# Patient Record
Sex: MALE | Race: WHITE | NOT HISPANIC OR LATINO | Employment: FULL TIME | RURAL
[De-identification: names, ages, dates, MRNs, and addresses within clinical notes are randomized per-mention and may not be internally consistent; named-entity substitution may affect disease eponyms.]

---

## 2019-04-12 ENCOUNTER — OFFICE VISIT (OUTPATIENT)
Dept: OTOLARYNGOLOGY | Facility: CLINIC | Age: 39
End: 2019-04-12

## 2019-04-12 VITALS — WEIGHT: 274 LBS | HEIGHT: 70 IN | BODY MASS INDEX: 39.22 KG/M2 | OXYGEN SATURATION: 98 %

## 2019-04-12 DIAGNOSIS — J32.8 OTHER CHRONIC SINUSITIS: Primary | ICD-10-CM

## 2019-04-12 DIAGNOSIS — J32.9 CHRONIC SINUSITIS, UNSPECIFIED LOCATION: Primary | ICD-10-CM

## 2019-04-12 DIAGNOSIS — J34.89 NASAL VALVE COLLAPSE: ICD-10-CM

## 2019-04-12 PROCEDURE — 31231 NASAL ENDOSCOPY DX: CPT | Performed by: OTOLARYNGOLOGY

## 2019-04-12 PROCEDURE — 99203 OFFICE O/P NEW LOW 30 MIN: CPT | Performed by: OTOLARYNGOLOGY

## 2019-04-12 RX ORDER — LISINOPRIL 10 MG/1
10 TABLET ORAL DAILY
COMMUNITY

## 2019-04-12 RX ORDER — DEXTROAMPHETAMINE SACCHARATE, AMPHETAMINE ASPARTATE, DEXTROAMPHETAMINE SULFATE AND AMPHETAMINE SULFATE 7.5; 7.5; 7.5; 7.5 MG/1; MG/1; MG/1; MG/1
30 TABLET ORAL DAILY
COMMUNITY

## 2019-04-12 RX ORDER — SERTRALINE HYDROCHLORIDE 100 MG/1
100 TABLET, FILM COATED ORAL NIGHTLY
COMMUNITY

## 2019-04-12 RX ORDER — ATENOLOL 25 MG/1
25 TABLET ORAL DAILY
COMMUNITY

## 2019-04-12 RX ORDER — OMEPRAZOLE 40 MG/1
40 CAPSULE, DELAYED RELEASE ORAL DAILY
COMMUNITY

## 2019-04-12 RX ORDER — PREGABALIN 200 MG/1
200 CAPSULE ORAL 3 TIMES DAILY
COMMUNITY

## 2019-04-12 RX ORDER — LOPERAMIDE HYDROCHLORIDE 2 MG/1
2 CAPSULE ORAL 4 TIMES DAILY PRN
COMMUNITY

## 2019-04-12 RX ORDER — TRAZODONE HYDROCHLORIDE 50 MG/1
25 TABLET ORAL NIGHTLY
COMMUNITY

## 2019-04-12 RX ORDER — SUMATRIPTAN 100 MG/1
100 TABLET, FILM COATED ORAL
COMMUNITY

## 2019-04-12 NOTE — PROGRESS NOTES
Subjective   Esequiel Figueroa is a 39 y.o. male.     History of Present Illness   Patient is seen at the request of the [facility].  Has a history of previous sinus surgery performed in 2009 while in the [employer].  Reports he has had recurring difficulty with episodes of purulent rhinorrhea, postnasal drainage, facial pressure, and sinus headache.  Has had multiple rounds of antibiotics through the winter.  Additionally is on a full facemask CPAP, and has difficulty utilizing this due to collapse of his external nasal airway on the left.  Patient states this is been present for years.  Awakens every morning with crusting and congestion in his nares and states his CPAP is ineffective and he still feels tired during the day and he perceives this to be the cause.      The following portions of the patient's history were reviewed and updated as appropriate: allergies, current medications, past family history, past medical history, past social history, past surgical history and problem list.      Esequiel Figueroa reports that he has quit smoking. He has never used smokeless tobacco.  Patient is not a tobacco user and has not been counseled for use of tobacco products    Family History   Problem Relation Age of Onset   • Thyroid disease Mother        Allergies   Allergen Reactions   • Septra [Sulfamethoxazole-Trimethoprim] Anaphylaxis   • Penicillins Hives   • Vicodin [Hydrocodone-Acetaminophen] Hives         Current Outpatient Medications:   •  amphetamine-dextroamphetamine (ADDERALL) 30 MG tablet, Take 30 mg by mouth Daily., Disp: , Rfl:   •  atenolol (TENORMIN) 25 MG tablet, Take 25 mg by mouth Daily., Disp: , Rfl:   •  DULOXETINE HCL PO, Take 90 mg by mouth., Disp: , Rfl:   •  lisinopril (PRINIVIL,ZESTRIL) 10 MG tablet, Take 10 mg by mouth Daily., Disp: , Rfl:   •  loperamide (IMODIUM) 2 MG capsule, Take 2 mg by mouth 4 (Four) Times a Day As Needed for Diarrhea., Disp: , Rfl:   •  omeprazole (priLOSEC) 40 MG  capsule, Take 40 mg by mouth Daily., Disp: , Rfl:   •  pregabalin (LYRICA) 200 MG capsule, Take 200 mg by mouth 2 (Two) Times a Day., Disp: , Rfl:   •  sertraline (ZOLOFT) 100 MG tablet, Take 100 mg by mouth Daily., Disp: , Rfl:   •  SUMAtriptan (IMITREX) 100 MG tablet, Take 100 mg by mouth Every 2 (Two) Hours As Needed for Migraine. Take one tablet at onset of headache. May repeat dose one time in 2 hours if headache not relieved., Disp: , Rfl:   •  tapentadol (NUCYNTA) 50 MG tablet, Take 100 mg by mouth Every 6 (Six) Hours As Needed., Disp: , Rfl:   •  traZODone (DESYREL) 25 MG half tablet, Take 25 mg by mouth Every Night., Disp: , Rfl:     Past Medical History:   Diagnosis Date   • IBS (irritable bowel syndrome)    • Neuropathy    • PTSD (post-traumatic stress disorder)    • Sleep apnea          Review of Systems   Gastrointestinal: Positive for abdominal pain and diarrhea.        Heartburn   Musculoskeletal: Positive for arthralgias.        Leg pain   Psychiatric/Behavioral:        Depression   All other systems reviewed and are negative.          Objective   Physical Exam  General: Well-developed well-nourished male in no acute distress.  Alert and oriented x3. Head: Normocephalic. Face: Symmetrical strength and appearance. PERRL. EOMI. Voice:Strong. Speech:Fluent  Ears: External ears no deformity, canals show some nonobstructing wax., tympanic membranes appear to be without infection or effusion  Nose: Nares show no discharge mass polyp or purulence.  Boggy mucosa is present.  No gross external deformity.  Septum: Midline.  Substantial nasal valve collapse on the left with inspiration  Oral cavity: Lips and gums without lesions.  Tongue and floor of mouth without lesions.  Parotid and submandibular ducts unobstructed.  No mucosal lesions on the buccal mucosa or vestibule of the mouth.  Pharynx: No erythema exudate mass or ulcer  Neck: No lymphadenopathy.  No thyromegaly.  Trachea and larynx midline.  No masses  in the parotid or submandibular glands.  Nasal endoscopy is performed: Willian-Synephrine and Xylocaine are instilled the nares bilaterally.  0° scope is passed into each nostril.  The inferior, middle, and superior turbinates as well as nasal septum and nasopharynx are examined.  Pertinent findings include: Postsurgical changes in the middle meatal clefts bilaterally.  Synechia between the medial aspect of the middle turbinate and the nasal septum bilaterally.  Small maxillary antrostomy is present on the left.  Larger antrostomy is present on the right.  No evidence of polyps or purulence seen today.      Assessment/Plan   Esequiel was seen today for sinus problem.    Diagnoses and all orders for this visit:    Chronic sinusitis, unspecified location    Nasal valve collapse        Plan: Explained to the patient that I do not perform repair of nasal valve collapse in my practice but my partner Dr. Chavez does.  I will obtain a CT scan of the sinuses to evaluate for the presence of chronic sinus disease/retained infection and then have the patient return for an evaluation by Dr. Chavez.

## 2019-05-16 ENCOUNTER — OFFICE VISIT (OUTPATIENT)
Dept: OTOLARYNGOLOGY | Facility: CLINIC | Age: 39
End: 2019-05-16

## 2019-05-16 ENCOUNTER — PREP FOR SURGERY (OUTPATIENT)
Dept: OTHER | Facility: HOSPITAL | Age: 39
End: 2019-05-16

## 2019-05-16 ENCOUNTER — APPOINTMENT (OUTPATIENT)
Dept: PREADMISSION TESTING | Facility: HOSPITAL | Age: 39
End: 2019-05-16

## 2019-05-16 VITALS
HEART RATE: 91 BPM | DIASTOLIC BLOOD PRESSURE: 86 MMHG | OXYGEN SATURATION: 96 % | HEIGHT: 70 IN | SYSTOLIC BLOOD PRESSURE: 144 MMHG | BODY MASS INDEX: 39.51 KG/M2 | RESPIRATION RATE: 14 BRPM | WEIGHT: 276 LBS

## 2019-05-16 VITALS
WEIGHT: 276.4 LBS | HEART RATE: 120 BPM | BODY MASS INDEX: 39.57 KG/M2 | TEMPERATURE: 98.5 F | OXYGEN SATURATION: 98 % | HEIGHT: 70 IN

## 2019-05-16 DIAGNOSIS — J34.3 HYPERTROPHY, NASAL, TURBINATE: ICD-10-CM

## 2019-05-16 DIAGNOSIS — J34.2 NASAL SEPTAL DEVIATION: ICD-10-CM

## 2019-05-16 DIAGNOSIS — J34.89 NASAL VALVE COLLAPSE: Primary | ICD-10-CM

## 2019-05-16 DIAGNOSIS — J34.3 NASAL TURBINATE HYPERTROPHY: ICD-10-CM

## 2019-05-16 DIAGNOSIS — J34.89 NASAL VALVE BLOCKAGE: Primary | ICD-10-CM

## 2019-05-16 PROCEDURE — 99214 OFFICE O/P EST MOD 30 MIN: CPT | Performed by: OTOLARYNGOLOGY

## 2019-05-16 PROCEDURE — 93010 ELECTROCARDIOGRAM REPORT: CPT | Performed by: INTERNAL MEDICINE

## 2019-05-16 PROCEDURE — 93005 ELECTROCARDIOGRAM TRACING: CPT

## 2019-05-16 RX ORDER — FLUTICASONE PROPIONATE 50 MCG
2 SPRAY, SUSPENSION (ML) NASAL DAILY
COMMUNITY
End: 2019-05-24 | Stop reason: HOSPADM

## 2019-05-16 RX ORDER — CETIRIZINE HYDROCHLORIDE 10 MG/1
10 TABLET ORAL NIGHTLY
COMMUNITY

## 2019-05-16 RX ORDER — MONTELUKAST SODIUM 10 MG/1
10 TABLET ORAL DAILY
COMMUNITY

## 2019-05-16 RX ORDER — SODIUM CHLORIDE, SODIUM GLUCONATE, SODIUM ACETATE, POTASSIUM CHLORIDE, AND MAGNESIUM CHLORIDE 526; 502; 368; 37; 30 MG/100ML; MG/100ML; MG/100ML; MG/100ML; MG/100ML
1000 INJECTION, SOLUTION INTRAVENOUS CONTINUOUS
Status: CANCELLED | OUTPATIENT
Start: 2019-05-24

## 2019-05-16 NOTE — PROGRESS NOTES
Subjective   Esequiel Figueroa is a 39 y.o. male.   Follow-up nasal problems  History of Present Illness   She comes referred for opinion regarding nasal valve surgery he has a CT which she brings with him we do not have the actual report he has trouble breathing through his nose his nose collapses more on the left than the right he has had sinus surgery in the past is not having a lot of sinus infections biggest problems breathing through his nose and using his CPAP he uses a full mask    States doing well with multiple other surgeries including sinus surgery is not any anesthesia or bleeding problems and understands her risk of the surgery he works a more sedentary job  The following portions of the patient's history were reviewed and updated as appropriate: allergies, current medications, past family history, past medical history, past social history, past surgical history and problem list.      Esequiel Figueroa reports that he has quit smoking. He has never used smokeless tobacco. He reports that he does not drink alcohol or use drugs.  Patient is not a tobacco user and has been counseled for use of tobacco products    Family History   Problem Relation Age of Onset   • Thyroid disease Mother          Current Outpatient Medications:   •  amphetamine-dextroamphetamine (ADDERALL) 30 MG tablet, Take 30 mg by mouth Daily., Disp: , Rfl:   •  atenolol (TENORMIN) 25 MG tablet, Take 25 mg by mouth Daily., Disp: , Rfl:   •  DULOXETINE HCL PO, Take 90 mg by mouth., Disp: , Rfl:   •  lisinopril (PRINIVIL,ZESTRIL) 10 MG tablet, Take 10 mg by mouth Daily., Disp: , Rfl:   •  loperamide (IMODIUM) 2 MG capsule, Take 2 mg by mouth 4 (Four) Times a Day As Needed for Diarrhea., Disp: , Rfl:   •  omeprazole (priLOSEC) 40 MG capsule, Take 40 mg by mouth Daily., Disp: , Rfl:   •  pregabalin (LYRICA) 200 MG capsule, Take 200 mg by mouth 2 (Two) Times a Day., Disp: , Rfl:   •  sertraline (ZOLOFT) 100 MG tablet, Take 100 mg by mouth Daily.,  Disp: , Rfl:   •  SUMAtriptan (IMITREX) 100 MG tablet, Take 100 mg by mouth Every 2 (Two) Hours As Needed for Migraine. Take one tablet at onset of headache. May repeat dose one time in 2 hours if headache not relieved., Disp: , Rfl:   •  tapentadol (NUCYNTA) 50 MG tablet, Take 100 mg by mouth Every 6 (Six) Hours As Needed., Disp: , Rfl:   •  traZODone (DESYREL) 25 MG half tablet, Take 25 mg by mouth Every Night., Disp: , Rfl:     Allergies   Allergen Reactions   • Septra [Sulfamethoxazole-Trimethoprim] Anaphylaxis   • Penicillins Hives   • Vicodin [Hydrocodone-Acetaminophen] Hives       Past Medical History:   Diagnosis Date   • IBS (irritable bowel syndrome)    • Neuropathy    • PTSD (post-traumatic stress disorder)    • Sleep apnea        Past Surgical History:   Procedure Laterality Date   • APPENDECTOMY     • CARPAL TUNNEL RELEASE Bilateral    • KNEE ARTHROSCOPY     • ROTATOR CUFF REPAIR Right    • TRIGGER FINGER RELEASE     • ULNAR NERVE REPAIR Bilateral    • WRIST SURGERY         Review of Systems   HENT: Positive for congestion.    Gastrointestinal: Positive for diarrhea.   Musculoskeletal: Positive for arthralgias, back pain, neck pain and neck stiffness.   Neurological: Positive for numbness and headaches.   All other systems reviewed and are negative.          Objective   Physical Exam   Constitutional: He is oriented to person, place, and time. He appears well-developed and well-nourished.   HENT:   Head: Normocephalic and atraumatic.   Right Ear: Hearing, tympanic membrane, external ear and ear canal normal.   Left Ear: Hearing, tympanic membrane, external ear and ear canal normal.   Nose: Mucosal edema, nasal deformity and septal deviation present. No rhinorrhea. No epistaxis. Right sinus exhibits no maxillary sinus tenderness and no frontal sinus tenderness. Left sinus exhibits no maxillary sinus tenderness and no frontal sinus tenderness.       Mouth/Throat: Uvula is midline and oropharynx is clear  and moist. No trismus in the jaw. Normal dentition. No oropharyngeal exudate or posterior oropharyngeal edema.   Eyes: Conjunctivae are normal.   Neck: Neck supple. No JVD present. No tracheal deviation present. No thyromegaly present.   Cardiovascular: Normal rate, regular rhythm and normal heart sounds.   Pulmonary/Chest: Effort normal and breath sounds normal.   Musculoskeletal: Normal range of motion.   Lymphadenopathy:        Head (right side): No submental, no submandibular, no tonsillar, no preauricular, no posterior auricular and no occipital adenopathy present.        Head (left side): No submental, no submandibular, no tonsillar, no preauricular, no posterior auricular and no occipital adenopathy present.     He has no cervical adenopathy.        Right cervical: No superficial cervical, no deep cervical and no posterior cervical adenopathy present.       Left cervical: No superficial cervical, no deep cervical and no posterior cervical adenopathy present.   Neurological: He is alert and oriented to person, place, and time. No cranial nerve deficit.   Skin: Skin is warm.   Psychiatric: He has a normal mood and affect. His speech is normal and behavior is normal. Thought content normal.   Nursing note and vitals reviewed.      CT was reviewed by me showing deviated septum no active sinus disease and turbinate hypertrophy      Assessment/Plan   Esequiel was seen today for results.    Diagnoses and all orders for this visit:    Nasal valve collapse    Nasal septal deviation    Nasal turbinate hypertrophy    Discussed surgery versus medical options the patient prefers surgical approach she is interested nasal septal turbinate surgery and valve surgery particularly that issue seems to be the major issue is breathing discussed surgical risks of chronic crusting  , congestion  , decreased sense of smell, delayed bleeding, infection and difficulty using CPAP  I discussed the risk of surgery which include failure to  help, chronic crusting chronic breathing problems need for other surgery change in appearance intolerance almost certainly likely changes parents discussed the postop care and decreased sense of smell decreased activity no swelling need for pain medicine afterwards we discussed that this does not always work notes likely be helpful he wants to go with surgical approach and all questions were answered

## 2019-05-16 NOTE — PAT
Called Dr. Patterson read EKG over the phone, patient relates only heart history hypertension, no hx of MI, can climb 2 flights of stairs without getting short of breath, has no current or hx of chest pain, may proceed with surgery no further orders or instructions.  Opioid pain medication pamphlet given.

## 2019-05-16 NOTE — PATIENT INSTRUCTIONS

## 2019-05-16 NOTE — DISCHARGE INSTRUCTIONS
[facility]  Pre-op Information and Guidelines    You will be called after 2 p.m. the day before your surgery (Friday for Monday surgery) and notified of your time for arrival and approximate surgery time.  If you have not received a call by 4P.M., please contact Same Day Surgery at [phone] of if outside [facility] call [phone].    Please Follow these Important Safety Guidelines:    • The morning of your procedure, take only the medications listed below with   A sip of water:_____________________________________________       ______________________________________________    • DO NOT eat or drink anything after 12:00 midnight the night before surgery  Specific instructions concerning drinking clear liquids will be discussed during  the pre-surgery instruction call the day before your surgery.    • If you take a blood thinner (ex. Plavix, Coumadin, aspirin), ask your doctor when to stop it before surgery  STOP DATE: _________________    • Only 2 visitors are allowed in patient rooms at a time  Your visitors will be asked to wait in the lobby until the admission process is complete with the exception of a parent with a child and patients in need of special assistance.    • YOU CANNOT DRIVE YOURSELF HOME  You must be accompanied by someone who will be responsible for driving you home after surgery and for your care at home.    • DO NOT chew gum, use breath mints, hard candy, or smoke the day of surgery  • DO NOT drink alcohol for at least 24 hours before your surgery  • DO NOT wear any jewelry and remove all body piercing before coming to the hospital  • DO NOT wear make-up to the hospital  • If you are having surgery on an extremity (arm/leg/foot) remove nail polish/artificial nails on the surgical side  • Clothing, glasses, contacts, dentures, and hairpieces must be removed before surgery  • Bathe the night before or the morning of your surgery and do not use powders/lotions on  skin.

## 2019-05-23 ENCOUNTER — ANESTHESIA EVENT (OUTPATIENT)
Dept: PERIOP | Facility: HOSPITAL | Age: 39
End: 2019-05-23

## 2019-05-24 ENCOUNTER — HOSPITAL ENCOUNTER (OUTPATIENT)
Facility: HOSPITAL | Age: 39
Setting detail: HOSPITAL OUTPATIENT SURGERY
Discharge: HOME | End: 2019-05-24
Attending: OTOLARYNGOLOGY | Admitting: OTOLARYNGOLOGY

## 2019-05-24 ENCOUNTER — ANESTHESIA (OUTPATIENT)
Dept: PERIOP | Facility: HOSPITAL | Age: 39
End: 2019-05-24

## 2019-05-24 VITALS
DIASTOLIC BLOOD PRESSURE: 70 MMHG | HEART RATE: 77 BPM | WEIGHT: 272.49 LBS | HEIGHT: 70 IN | SYSTOLIC BLOOD PRESSURE: 127 MMHG | OXYGEN SATURATION: 99 % | TEMPERATURE: 97 F | BODY MASS INDEX: 39.01 KG/M2 | RESPIRATION RATE: 18 BRPM

## 2019-05-24 PROCEDURE — 30465 REPAIR NASAL STENOSIS: CPT | Performed by: OTOLARYNGOLOGY

## 2019-05-24 PROCEDURE — 25010000002 ONDANSETRON PER 1 MG: Performed by: NURSE ANESTHETIST, CERTIFIED REGISTERED

## 2019-05-24 PROCEDURE — 25010000002 SUCCINYLCHOLINE PER 20 MG: Performed by: NURSE ANESTHETIST, CERTIFIED REGISTERED

## 2019-05-24 PROCEDURE — 25010000002 PHENYLEPHRINE PER 1 ML: Performed by: NURSE ANESTHETIST, CERTIFIED REGISTERED

## 2019-05-24 PROCEDURE — 25010000002 DEXAMETHASONE PER 1 MG: Performed by: NURSE ANESTHETIST, CERTIFIED REGISTERED

## 2019-05-24 PROCEDURE — 25010000002 FENTANYL CITRATE (PF) 100 MCG/2ML SOLUTION: Performed by: NURSE ANESTHETIST, CERTIFIED REGISTERED

## 2019-05-24 PROCEDURE — 30130 EXCISE INFERIOR TURBINATE: CPT | Performed by: OTOLARYNGOLOGY

## 2019-05-24 PROCEDURE — 25010000002 PROPOFOL 10 MG/ML EMULSION: Performed by: NURSE ANESTHETIST, CERTIFIED REGISTERED

## 2019-05-24 RX ORDER — ROCURONIUM BROMIDE 10 MG/ML
INJECTION, SOLUTION INTRAVENOUS AS NEEDED
Status: DISCONTINUED | OUTPATIENT
Start: 2019-05-24 | End: 2019-05-24 | Stop reason: SURG

## 2019-05-24 RX ORDER — HYDROMORPHONE HYDROCHLORIDE 2 MG/1
2 TABLET ORAL EVERY 4 HOURS PRN
Qty: 18 TABLET | Refills: 0 | Status: SHIPPED | OUTPATIENT
Start: 2019-05-24 | End: 2019-07-02 | Stop reason: HOSPADM

## 2019-05-24 RX ORDER — DEXAMETHASONE SODIUM PHOSPHATE 4 MG/ML
INJECTION, SOLUTION INTRA-ARTICULAR; INTRALESIONAL; INTRAMUSCULAR; INTRAVENOUS; SOFT TISSUE AS NEEDED
Status: DISCONTINUED | OUTPATIENT
Start: 2019-05-24 | End: 2019-05-24 | Stop reason: SURG

## 2019-05-24 RX ORDER — EPHEDRINE SULFATE 50 MG/ML
INJECTION, SOLUTION INTRAVENOUS AS NEEDED
Status: DISCONTINUED | OUTPATIENT
Start: 2019-05-24 | End: 2019-05-24 | Stop reason: SURG

## 2019-05-24 RX ORDER — SODIUM CHLORIDE, SODIUM GLUCONATE, SODIUM ACETATE, POTASSIUM CHLORIDE, AND MAGNESIUM CHLORIDE 526; 502; 368; 37; 30 MG/100ML; MG/100ML; MG/100ML; MG/100ML; MG/100ML
1000 INJECTION, SOLUTION INTRAVENOUS CONTINUOUS
Status: DISCONTINUED | OUTPATIENT
Start: 2019-05-24 | End: 2019-05-24 | Stop reason: HOSPADM

## 2019-05-24 RX ORDER — ONDANSETRON 2 MG/ML
4 INJECTION INTRAMUSCULAR; INTRAVENOUS ONCE AS NEEDED
Status: DISCONTINUED | OUTPATIENT
Start: 2019-05-24 | End: 2019-05-24 | Stop reason: HOSPADM

## 2019-05-24 RX ORDER — SUCCINYLCHOLINE CHLORIDE 20 MG/ML
INJECTION INTRAMUSCULAR; INTRAVENOUS AS NEEDED
Status: DISCONTINUED | OUTPATIENT
Start: 2019-05-24 | End: 2019-05-24 | Stop reason: SURG

## 2019-05-24 RX ORDER — OXYMETAZOLINE HYDROCHLORIDE 0.05 G/100ML
SPRAY NASAL AS NEEDED
Status: DISCONTINUED | OUTPATIENT
Start: 2019-05-24 | End: 2019-05-24 | Stop reason: HOSPADM

## 2019-05-24 RX ORDER — PROPOFOL 10 MG/ML
VIAL (ML) INTRAVENOUS AS NEEDED
Status: DISCONTINUED | OUTPATIENT
Start: 2019-05-24 | End: 2019-05-24 | Stop reason: SURG

## 2019-05-24 RX ORDER — LIDOCAINE HYDROCHLORIDE AND EPINEPHRINE 10; 10 MG/ML; UG/ML
INJECTION, SOLUTION INFILTRATION; PERINEURAL AS NEEDED
Status: DISCONTINUED | OUTPATIENT
Start: 2019-05-24 | End: 2019-05-24 | Stop reason: HOSPADM

## 2019-05-24 RX ORDER — CLINDAMYCIN HYDROCHLORIDE 150 MG/1
300 CAPSULE ORAL EVERY 8 HOURS
Qty: 36 CAPSULE | Refills: 0 | Status: SHIPPED | OUTPATIENT
Start: 2019-05-24 | End: 2019-05-31

## 2019-05-24 RX ORDER — LIDOCAINE HYDROCHLORIDE 20 MG/ML
INJECTION, SOLUTION INFILTRATION; PERINEURAL AS NEEDED
Status: DISCONTINUED | OUTPATIENT
Start: 2019-05-24 | End: 2019-05-24 | Stop reason: SURG

## 2019-05-24 RX ORDER — HYDROMORPHONE HYDROCHLORIDE 2 MG/1
2 TABLET ORAL
Status: DISCONTINUED | OUTPATIENT
Start: 2019-05-24 | End: 2019-05-24 | Stop reason: HOSPADM

## 2019-05-24 RX ORDER — ONDANSETRON 2 MG/ML
INJECTION INTRAMUSCULAR; INTRAVENOUS AS NEEDED
Status: DISCONTINUED | OUTPATIENT
Start: 2019-05-24 | End: 2019-05-24 | Stop reason: SURG

## 2019-05-24 RX ORDER — FENTANYL CITRATE 50 UG/ML
INJECTION, SOLUTION INTRAMUSCULAR; INTRAVENOUS AS NEEDED
Status: DISCONTINUED | OUTPATIENT
Start: 2019-05-24 | End: 2019-05-24 | Stop reason: SURG

## 2019-05-24 RX ADMIN — PHENYLEPHRINE HYDROCHLORIDE 200 MCG: 10 INJECTION INTRAVENOUS at 08:56

## 2019-05-24 RX ADMIN — PROPOFOL 200 MG: 10 INJECTION, EMULSION INTRAVENOUS at 08:26

## 2019-05-24 RX ADMIN — FENTANYL CITRATE 50 MCG: 50 INJECTION, SOLUTION INTRAMUSCULAR; INTRAVENOUS at 08:35

## 2019-05-24 RX ADMIN — EPHEDRINE SULFATE 10 MG: 50 INJECTION INTRAVENOUS at 08:53

## 2019-05-24 RX ADMIN — SODIUM CHLORIDE, SODIUM GLUCONATE, SODIUM ACETATE, POTASSIUM CHLORIDE, AND MAGNESIUM CHLORIDE: 526; 502; 368; 37; 30 INJECTION, SOLUTION INTRAVENOUS at 09:39

## 2019-05-24 RX ADMIN — EPHEDRINE SULFATE 10 MG: 50 INJECTION INTRAVENOUS at 09:01

## 2019-05-24 RX ADMIN — ONDANSETRON 4 MG: 2 INJECTION INTRAMUSCULAR; INTRAVENOUS at 08:41

## 2019-05-24 RX ADMIN — EPHEDRINE SULFATE 10 MG: 50 INJECTION INTRAVENOUS at 08:56

## 2019-05-24 RX ADMIN — PHENYLEPHRINE HYDROCHLORIDE 100 MCG: 10 INJECTION INTRAVENOUS at 09:27

## 2019-05-24 RX ADMIN — SODIUM CHLORIDE, SODIUM GLUCONATE, SODIUM ACETATE, POTASSIUM CHLORIDE, AND MAGNESIUM CHLORIDE 1000 ML: 526; 502; 368; 37; 30 INJECTION, SOLUTION INTRAVENOUS at 07:05

## 2019-05-24 RX ADMIN — SUCCINYLCHOLINE CHLORIDE 180 MG: 20 INJECTION, SOLUTION INTRAMUSCULAR; INTRAVENOUS at 08:29

## 2019-05-24 RX ADMIN — PHENYLEPHRINE HYDROCHLORIDE 100 MCG: 10 INJECTION INTRAVENOUS at 09:14

## 2019-05-24 RX ADMIN — PHENYLEPHRINE HYDROCHLORIDE 200 MCG: 10 INJECTION INTRAVENOUS at 09:02

## 2019-05-24 RX ADMIN — PHENYLEPHRINE HYDROCHLORIDE 100 MCG: 10 INJECTION INTRAVENOUS at 09:25

## 2019-05-24 RX ADMIN — DEXAMETHASONE SODIUM PHOSPHATE 4 MG: 4 INJECTION, SOLUTION INTRAMUSCULAR; INTRAVENOUS at 08:35

## 2019-05-24 RX ADMIN — LIDOCAINE HYDROCHLORIDE 100 MG: 20 INJECTION, SOLUTION INFILTRATION; PERINEURAL at 08:26

## 2019-05-24 RX ADMIN — PROPOFOL 200 MG: 10 INJECTION, EMULSION INTRAVENOUS at 08:30

## 2019-05-24 RX ADMIN — PHENYLEPHRINE HYDROCHLORIDE 200 MCG: 10 INJECTION INTRAVENOUS at 09:00

## 2019-05-24 RX ADMIN — EPHEDRINE SULFATE 10 MG: 50 INJECTION INTRAVENOUS at 09:12

## 2019-05-24 RX ADMIN — PHENYLEPHRINE HYDROCHLORIDE 100 MCG: 10 INJECTION INTRAVENOUS at 08:51

## 2019-05-24 RX ADMIN — FENTANYL CITRATE 50 MCG: 50 INJECTION, SOLUTION INTRAMUSCULAR; INTRAVENOUS at 08:23

## 2019-05-24 RX ADMIN — ROCURONIUM BROMIDE 10 MG: 10 INJECTION INTRAVENOUS at 08:43

## 2019-05-24 RX ADMIN — PHENYLEPHRINE HYDROCHLORIDE 100 MCG: 10 INJECTION INTRAVENOUS at 09:17

## 2019-05-24 RX ADMIN — ROCURONIUM BROMIDE 5 MG: 10 INJECTION INTRAVENOUS at 08:23

## 2019-05-24 RX ADMIN — SODIUM CHLORIDE, SODIUM GLUCONATE, SODIUM ACETATE, POTASSIUM CHLORIDE, AND MAGNESIUM CHLORIDE: 526; 502; 368; 37; 30 INJECTION, SOLUTION INTRAVENOUS at 09:01

## 2019-05-24 RX ADMIN — EPHEDRINE SULFATE 10 MG: 50 INJECTION INTRAVENOUS at 09:00

## 2019-05-24 NOTE — ANESTHESIA PROCEDURE NOTES
Airway  Urgency: elective    Date/Time: 5/24/2019 8:30 AM  End Time:5/24/2019 8:31 AM  Airway not difficult    General Information and Staff    Patient location during procedure: OR  CRNA: Mumtaz Gan CRNA    Indications and Patient Condition  Indications for airway management: airway protection    Preoxygenated: yes  MILS not maintained throughout  Mask difficulty assessment: 1 - vent by mask    Final Airway Details  Final airway type: endotracheal airway      Successful airway: ETT  Cuffed: yes   Successful intubation technique: direct laryngoscopy  Facilitating devices/methods: intubating stylet  Endotracheal tube insertion site: oral  Blade: Ria  Blade size: 3  ETT size (mm): 7.5  Cormack-Lehane Classification: grade IIb - view of arytenoids or posterior of glottis only  Placement verified by: chest auscultation and capnometry   Measured from: lips  ETT to lips (cm): 24  Number of attempts at approach: 2    Additional Comments  SRNA attempted intubation with oral LIAN

## 2019-05-24 NOTE — ANESTHESIA PREPROCEDURE EVALUATION
Anesthesia Evaluation     Patient summary reviewed   NPO Solid Status: > 8 hours  NPO Liquid Status: > 8 hours           Airway   Mallampati: II  TM distance: >3 FB  Neck ROM: full  Possible difficult intubation and Large neck circumference  Dental    (+) poor dentition    Pulmonary - normal exam   (+) asthma, sleep apnea,   Cardiovascular - normal exam  Exercise tolerance: good (4-7 METS)    NYHA Classification: II  Patient on routine beta blocker    (+) hypertension, hyperlipidemia,       Neuro/Psych  (+) headaches, psychiatric history Anxiety,     GI/Hepatic/Renal/Endo    (+)  GERD,      Musculoskeletal     Abdominal    Substance History      OB/GYN          Other   (+) arthritis                     Anesthesia Plan    ASA 3     general     intravenous induction   Anesthetic plan, all risks, benefits, and alternatives have been provided, discussed and informed consent has been obtained with: patient.

## 2019-05-24 NOTE — ANESTHESIA POSTPROCEDURE EVALUATION
Patient: Esequiel Figueroa    Procedure Summary     Date:  05/24/19 Room / Location:  [facility] OR  / [facility] OR    Anesthesia Start:  0819 Anesthesia Stop:  0952    Procedure:  NASAL VALVE REPAIR , NASAL SEPTAL SURGERY, AND BILATERAL TURBINATE SURGERY (N/A ) Diagnosis:       Nasal valve blockage      Nasal septal deviation      Hypertrophy, nasal, turbinate      (Nasal valve blockage [J34.89])      (Nasal septal deviation [J34.2])      (Hypertrophy, nasal, turbinate [J34.3])    Surgeon:  Jt Chavez MD Provider:  Albaro Patterson MD    Anesthesia Type:  general ASA Status:  3          Anesthesia Type: general  Last vitals  BP   106/68 (05/24/19 0657)   Temp   97.2 °F (36.2 °C) (05/24/19 0657)   Pulse   64 (05/24/19 0657)   Resp   18 (05/24/19 0657)     SpO2   96 % (05/24/19 0657)     Post Anesthesia Care and Evaluation    Patient location during evaluation: PACU  Post-procedure mental status: asleep.  Pain score: 0  Pain management: adequate  Airway patency: patent  Anesthetic complications: No anesthetic complications  PONV Status: none  Cardiovascular status: acceptable  Respiratory status: acceptable, spontaneous ventilation and oral airway  Hydration status: acceptable    Comments: o2 via simple mask in pacu and trip to pacu

## 2019-05-31 ENCOUNTER — OFFICE VISIT (OUTPATIENT)
Dept: OTOLARYNGOLOGY | Facility: CLINIC | Age: 39
End: 2019-05-31

## 2019-05-31 VITALS — HEIGHT: 70 IN | TEMPERATURE: 98.2 F | WEIGHT: 274.2 LBS | BODY MASS INDEX: 39.25 KG/M2

## 2019-05-31 DIAGNOSIS — Z09 POSTOP CHECK: Primary | ICD-10-CM

## 2019-05-31 PROCEDURE — 99024 POSTOP FOLLOW-UP VISIT: CPT | Performed by: OTOLARYNGOLOGY

## 2019-05-31 NOTE — PATIENT INSTRUCTIONS

## 2019-05-31 NOTE — PROGRESS NOTES
Patient comes in 1 week postop he is breathing better having mild soreness to the nose did not have any major bleeding was quite sore the day after surgery.  In the nose    Fever chills eye exam his nose decongested and suctioned small crust and he can be very better symptoms seem to be relatively straight me a little more swelling anterior than I expect.  Marcus with the difficulties we had in getting the valve situation control in the grafting that was used.  There is some crusting which I cleaned.  Sutures are still intact.  He has minimal change externally.  Suggested he begin using antibiotic ointment just in the anterior aspect the nose and explained to him how to do it and gave him samples of bacitracin suggest to use irrigation at least twice a day follow-up 3 weeks consider suture removal he is to call any question problems all questions answered.  FEROZ Chavez MD

## 2019-06-17 ENCOUNTER — TELEPHONE (OUTPATIENT)
Dept: OTOLARYNGOLOGY | Facility: CLINIC | Age: 39
End: 2019-06-17

## 2019-06-17 NOTE — TELEPHONE ENCOUNTER
----- Message from Viry Sandoval sent at 6/17/2019 10:28 AM CDT -----  Contact: [phone]  Has appt on Wednesday wanted to let you know his left nostral collapsed again.

## 2019-06-17 NOTE — TELEPHONE ENCOUNTER
Spoke with patient and let him know that maybe it is from some crusting, but that Dr. Chavez will have to look at it on Wednesday.

## 2019-06-19 ENCOUNTER — OFFICE VISIT (OUTPATIENT)
Dept: OTOLARYNGOLOGY | Facility: CLINIC | Age: 39
End: 2019-06-19

## 2019-06-19 VITALS
BODY MASS INDEX: 38.94 KG/M2 | HEART RATE: 100 BPM | TEMPERATURE: 97.4 F | HEIGHT: 70 IN | WEIGHT: 272 LBS | OXYGEN SATURATION: 97 %

## 2019-06-19 DIAGNOSIS — Z09 POSTOP CHECK: Primary | ICD-10-CM

## 2019-06-19 PROCEDURE — 99024 POSTOP FOLLOW-UP VISIT: CPT | Performed by: OTOLARYNGOLOGY

## 2019-06-19 NOTE — PROGRESS NOTES
Patient comes in for postop check is concerned his bowel may be plastic is been very general heart his nose when insomnia still breathing well.  Is having less crusting and the soreness is improved.    Examine his nose cleaned some crusting removed his sutures supported without the could told him there are some things we can do to help stiffen that but since he is breathing well I do not think that is worthwhile he agreed to that we will see him back in the next month he is to call for any questions but I am pleased he is breathing better is healing well I see no evidence of abnormal healing currently  FEROZ Chavez MD

## 2019-06-19 NOTE — PATIENT INSTRUCTIONS

## 2019-07-01 ENCOUNTER — ANESTHESIA EVENT (OUTPATIENT)
Dept: PERIOP | Facility: HOSPITAL | Age: 39
End: 2019-07-01

## 2019-07-01 ENCOUNTER — PREP FOR SURGERY (OUTPATIENT)
Dept: OTHER | Facility: HOSPITAL | Age: 39
End: 2019-07-01

## 2019-07-01 ENCOUNTER — ANESTHESIA (OUTPATIENT)
Dept: PERIOP | Facility: HOSPITAL | Age: 39
End: 2019-07-01

## 2019-07-01 ENCOUNTER — TELEPHONE (OUTPATIENT)
Dept: OTOLARYNGOLOGY | Facility: CLINIC | Age: 39
End: 2019-07-01

## 2019-07-01 ENCOUNTER — OFFICE VISIT (OUTPATIENT)
Dept: OTOLARYNGOLOGY | Facility: CLINIC | Age: 39
End: 2019-07-01

## 2019-07-01 ENCOUNTER — HOSPITAL ENCOUNTER (OUTPATIENT)
Facility: HOSPITAL | Age: 39
Discharge: HOME | End: 2019-07-02
Attending: OTOLARYNGOLOGY | Admitting: OTOLARYNGOLOGY

## 2019-07-01 VITALS
SYSTOLIC BLOOD PRESSURE: 145 MMHG | BODY MASS INDEX: 38.94 KG/M2 | WEIGHT: 272 LBS | HEIGHT: 70 IN | DIASTOLIC BLOOD PRESSURE: 80 MMHG

## 2019-07-01 DIAGNOSIS — R04.0 RECURRENT EPISTAXIS: Primary | ICD-10-CM

## 2019-07-01 DIAGNOSIS — R04.0 EPISTAXIS: Primary | ICD-10-CM

## 2019-07-01 PROBLEM — J34.3 HYPERTROPHY, NASAL, TURBINATE: Status: RESOLVED | Noted: 2019-05-16 | Resolved: 2019-07-01

## 2019-07-01 PROBLEM — J34.2 NASAL SEPTAL DEVIATION: Status: RESOLVED | Noted: 2019-05-16 | Resolved: 2019-07-01

## 2019-07-01 PROBLEM — J34.89 NASAL VALVE BLOCKAGE: Status: RESOLVED | Noted: 2019-05-16 | Resolved: 2019-07-01

## 2019-07-01 LAB
APTT PPP: 41.5 SECONDS (ref 20–40.3)
DEPRECATED RDW RBC AUTO: 37.5 FL (ref 37–54)
ERYTHROCYTE [DISTWIDTH] IN BLOOD BY AUTOMATED COUNT: 13.2 % (ref 12.3–15.4)
HCT VFR BLD AUTO: 44.5 % (ref 37.5–51)
HGB BLD-MCNC: 15.1 G/DL (ref 13–17.7)
INR PPP: 1.08 (ref 0.8–1.2)
MCH RBC QN AUTO: 26.8 PG (ref 26.6–33)
MCHC RBC AUTO-ENTMCNC: 33.9 G/DL (ref 31.5–35.7)
MCV RBC AUTO: 79 FL (ref 79–97)
PLATELET # BLD AUTO: 276 10*3/MM3 (ref 140–450)
PMV BLD AUTO: 9.6 FL (ref 6–12)
PROTHROMBIN TIME: 13.8 SECONDS (ref 11.1–15.3)
RBC # BLD AUTO: 5.63 10*6/MM3 (ref 4.14–5.8)
WBC NRBC COR # BLD: 13.48 10*3/MM3 (ref 3.4–10.8)

## 2019-07-01 PROCEDURE — 94760 N-INVAS EAR/PLS OXIMETRY 1: CPT

## 2019-07-01 PROCEDURE — 85610 PROTHROMBIN TIME: CPT | Performed by: OTOLARYNGOLOGY

## 2019-07-01 PROCEDURE — 36415 COLL VENOUS BLD VENIPUNCTURE: CPT | Performed by: OTOLARYNGOLOGY

## 2019-07-01 PROCEDURE — 25010000002 PROPOFOL 10 MG/ML EMULSION: Performed by: NURSE ANESTHETIST, CERTIFIED REGISTERED

## 2019-07-01 PROCEDURE — 25010000002 MIDAZOLAM PER 1 MG: Performed by: NURSE ANESTHETIST, CERTIFIED REGISTERED

## 2019-07-01 PROCEDURE — 94799 UNLISTED PULMONARY SVC/PX: CPT

## 2019-07-01 PROCEDURE — 25010000002 FENTANYL CITRATE (PF) 100 MCG/2ML SOLUTION: Performed by: NURSE ANESTHETIST, CERTIFIED REGISTERED

## 2019-07-01 PROCEDURE — 85027 COMPLETE CBC AUTOMATED: CPT | Performed by: OTOLARYNGOLOGY

## 2019-07-01 PROCEDURE — 85730 THROMBOPLASTIN TIME PARTIAL: CPT | Performed by: OTOLARYNGOLOGY

## 2019-07-01 PROCEDURE — 31238 NSL/SINS NDSC SRG NSL HEMRRG: CPT | Performed by: OTOLARYNGOLOGY

## 2019-07-01 PROCEDURE — 25010000002 NEOSTIGMINE 4 MG/4ML SOLUTION PREFILLED SYRINGE: Performed by: NURSE ANESTHETIST, CERTIFIED REGISTERED

## 2019-07-01 RX ORDER — LISINOPRIL 10 MG/1
10 TABLET ORAL DAILY
Status: DISCONTINUED | OUTPATIENT
Start: 2019-07-02 | End: 2019-07-02 | Stop reason: HOSPADM

## 2019-07-01 RX ORDER — CLARITHROMYCIN 250 MG/1
500 TABLET, FILM COATED ORAL EVERY 12 HOURS SCHEDULED
Status: DISCONTINUED | OUTPATIENT
Start: 2019-07-01 | End: 2019-07-01 | Stop reason: CLARIF

## 2019-07-01 RX ORDER — CLINDAMYCIN HYDROCHLORIDE 150 MG/1
300 CAPSULE ORAL EVERY 8 HOURS SCHEDULED
Status: DISCONTINUED | OUTPATIENT
Start: 2019-07-01 | End: 2019-07-02 | Stop reason: HOSPADM

## 2019-07-01 RX ORDER — DIPHENHYDRAMINE HCL 25 MG
25 CAPSULE ORAL EVERY 6 HOURS PRN
Status: DISCONTINUED | OUTPATIENT
Start: 2019-07-01 | End: 2019-07-02 | Stop reason: HOSPADM

## 2019-07-01 RX ORDER — FENTANYL CITRATE 50 UG/ML
INJECTION, SOLUTION INTRAMUSCULAR; INTRAVENOUS AS NEEDED
Status: DISCONTINUED | OUTPATIENT
Start: 2019-07-01 | End: 2019-07-01 | Stop reason: SURG

## 2019-07-01 RX ORDER — LIDOCAINE HYDROCHLORIDE AND EPINEPHRINE 10; 10 MG/ML; UG/ML
INJECTION, SOLUTION INFILTRATION; PERINEURAL AS NEEDED
Status: DISCONTINUED | OUTPATIENT
Start: 2019-07-01 | End: 2019-07-02 | Stop reason: HOSPADM

## 2019-07-01 RX ORDER — SODIUM CHLORIDE 0.9 % (FLUSH) 0.9 %
3 SYRINGE (ML) INJECTION EVERY 12 HOURS SCHEDULED
Status: DISCONTINUED | OUTPATIENT
Start: 2019-07-01 | End: 2019-07-02 | Stop reason: HOSPADM

## 2019-07-01 RX ORDER — PANTOPRAZOLE SODIUM 40 MG/1
40 TABLET, DELAYED RELEASE ORAL EVERY MORNING
Status: DISCONTINUED | OUTPATIENT
Start: 2019-07-02 | End: 2019-07-02 | Stop reason: HOSPADM

## 2019-07-01 RX ORDER — ONDANSETRON 2 MG/ML
4 INJECTION INTRAMUSCULAR; INTRAVENOUS ONCE AS NEEDED
Status: DISCONTINUED | OUTPATIENT
Start: 2019-07-01 | End: 2019-07-02 | Stop reason: HOSPADM

## 2019-07-01 RX ORDER — DEXTROSE, SODIUM CHLORIDE, AND POTASSIUM CHLORIDE 5; .45; .15 G/100ML; G/100ML; G/100ML
80 INJECTION INTRAVENOUS CONTINUOUS
Status: DISCONTINUED | OUTPATIENT
Start: 2019-07-01 | End: 2019-07-02 | Stop reason: HOSPADM

## 2019-07-01 RX ORDER — ONDANSETRON 2 MG/ML
4 INJECTION INTRAMUSCULAR; INTRAVENOUS ONCE AS NEEDED
Status: DISCONTINUED | OUTPATIENT
Start: 2019-07-01 | End: 2019-07-01 | Stop reason: HOSPADM

## 2019-07-01 RX ORDER — HYDROMORPHONE HYDROCHLORIDE 2 MG/1
2 TABLET ORAL EVERY 4 HOURS PRN
Status: DISCONTINUED | OUTPATIENT
Start: 2019-07-01 | End: 2019-07-02 | Stop reason: HOSPADM

## 2019-07-01 RX ORDER — SODIUM CHLORIDE, SODIUM GLUCONATE, SODIUM ACETATE, POTASSIUM CHLORIDE, AND MAGNESIUM CHLORIDE 526; 502; 368; 37; 30 MG/100ML; MG/100ML; MG/100ML; MG/100ML; MG/100ML
INJECTION, SOLUTION INTRAVENOUS CONTINUOUS PRN
Status: DISCONTINUED | OUTPATIENT
Start: 2019-07-01 | End: 2019-07-01 | Stop reason: SURG

## 2019-07-01 RX ORDER — TRAZODONE HYDROCHLORIDE 50 MG/1
25 TABLET ORAL NIGHTLY
Status: DISCONTINUED | OUTPATIENT
Start: 2019-07-01 | End: 2019-07-02 | Stop reason: HOSPADM

## 2019-07-01 RX ORDER — ROCURONIUM BROMIDE 10 MG/ML
INJECTION, SOLUTION INTRAVENOUS AS NEEDED
Status: DISCONTINUED | OUTPATIENT
Start: 2019-07-01 | End: 2019-07-01 | Stop reason: SURG

## 2019-07-01 RX ORDER — NEOSTIGMINE METHYLSULFATE 4 MG/4 ML
SYRINGE (ML) INTRAVENOUS AS NEEDED
Status: DISCONTINUED | OUTPATIENT
Start: 2019-07-01 | End: 2019-07-01 | Stop reason: SURG

## 2019-07-01 RX ORDER — ATENOLOL 25 MG/1
25 TABLET ORAL DAILY
Status: DISCONTINUED | OUTPATIENT
Start: 2019-07-02 | End: 2019-07-02 | Stop reason: HOSPADM

## 2019-07-01 RX ORDER — SODIUM CHLORIDE 0.9 % (FLUSH) 0.9 %
3-10 SYRINGE (ML) INJECTION AS NEEDED
Status: DISCONTINUED | OUTPATIENT
Start: 2019-07-01 | End: 2019-07-02 | Stop reason: HOSPADM

## 2019-07-01 RX ORDER — LOPERAMIDE HYDROCHLORIDE 2 MG/1
2 CAPSULE ORAL 4 TIMES DAILY PRN
Status: DISCONTINUED | OUTPATIENT
Start: 2019-07-01 | End: 2019-07-02 | Stop reason: HOSPADM

## 2019-07-01 RX ORDER — ACETAMINOPHEN 325 MG/1
650 TABLET ORAL EVERY 4 HOURS PRN
Status: DISCONTINUED | OUTPATIENT
Start: 2019-07-01 | End: 2019-07-02 | Stop reason: HOSPADM

## 2019-07-01 RX ORDER — PROPOFOL 10 MG/ML
VIAL (ML) INTRAVENOUS AS NEEDED
Status: DISCONTINUED | OUTPATIENT
Start: 2019-07-01 | End: 2019-07-01 | Stop reason: SURG

## 2019-07-01 RX ORDER — OXYMETAZOLINE HYDROCHLORIDE 0.05 G/100ML
SPRAY NASAL AS NEEDED
Status: DISCONTINUED | OUTPATIENT
Start: 2019-07-01 | End: 2019-07-02 | Stop reason: HOSPADM

## 2019-07-01 RX ORDER — LIDOCAINE HYDROCHLORIDE 20 MG/ML
INJECTION, SOLUTION INFILTRATION; PERINEURAL AS NEEDED
Status: DISCONTINUED | OUTPATIENT
Start: 2019-07-01 | End: 2019-07-01 | Stop reason: SURG

## 2019-07-01 RX ORDER — MIDAZOLAM HYDROCHLORIDE 1 MG/ML
INJECTION INTRAMUSCULAR; INTRAVENOUS AS NEEDED
Status: DISCONTINUED | OUTPATIENT
Start: 2019-07-01 | End: 2019-07-01 | Stop reason: SURG

## 2019-07-01 RX ADMIN — ROCURONIUM BROMIDE 10 MG: 10 INJECTION INTRAVENOUS at 12:39

## 2019-07-01 RX ADMIN — Medication 25 MG: at 20:53

## 2019-07-01 RX ADMIN — SODIUM CHLORIDE, SODIUM GLUCONATE, SODIUM ACETATE, POTASSIUM CHLORIDE, AND MAGNESIUM CHLORIDE: 526; 502; 368; 37; 30 INJECTION, SOLUTION INTRAVENOUS at 12:01

## 2019-07-01 RX ADMIN — LIDOCAINE HYDROCHLORIDE 100 MG: 20 INJECTION, SOLUTION INFILTRATION; PERINEURAL at 12:32

## 2019-07-01 RX ADMIN — GLYCOPYRROLATE 0.2 MG: 0.2 INJECTION, SOLUTION INTRAMUSCULAR; INTRAVITREAL at 13:05

## 2019-07-01 RX ADMIN — ROCURONIUM BROMIDE 10 MG: 10 INJECTION INTRAVENOUS at 12:34

## 2019-07-01 RX ADMIN — SERTRALINE HYDROCHLORIDE 100 MG: 50 TABLET ORAL at 20:53

## 2019-07-01 RX ADMIN — PROPOFOL 200 MG: 10 INJECTION, EMULSION INTRAVENOUS at 12:32

## 2019-07-01 RX ADMIN — PREGABALIN 200 MG: 75 CAPSULE ORAL at 20:53

## 2019-07-01 RX ADMIN — CLINDAMYCIN HYDROCHLORIDE 300 MG: 150 CAPSULE ORAL at 20:54

## 2019-07-01 RX ADMIN — PROPOFOL 50 MG: 10 INJECTION, EMULSION INTRAVENOUS at 13:05

## 2019-07-01 RX ADMIN — SODIUM CHLORIDE, SODIUM GLUCONATE, SODIUM ACETATE, POTASSIUM CHLORIDE, AND MAGNESIUM CHLORIDE: 526; 502; 368; 37; 30 INJECTION, SOLUTION INTRAVENOUS at 13:16

## 2019-07-01 RX ADMIN — Medication 2 MG: at 13:05

## 2019-07-01 RX ADMIN — PROPOFOL 100 MG: 10 INJECTION, EMULSION INTRAVENOUS at 12:38

## 2019-07-01 RX ADMIN — POTASSIUM CHLORIDE, DEXTROSE MONOHYDRATE AND SODIUM CHLORIDE 80 ML/HR: 150; 5; 450 INJECTION, SOLUTION INTRAVENOUS at 16:18

## 2019-07-01 RX ADMIN — MIDAZOLAM HYDROCHLORIDE 2 MG: 2 INJECTION, SOLUTION INTRAMUSCULAR; INTRAVENOUS at 12:21

## 2019-07-01 RX ADMIN — HYDROMORPHONE HYDROCHLORIDE 2 MG: 2 TABLET ORAL at 20:53

## 2019-07-01 RX ADMIN — PREGABALIN 200 MG: 75 CAPSULE ORAL at 16:07

## 2019-07-01 RX ADMIN — FENTANYL CITRATE 100 MCG: 50 INJECTION, SOLUTION INTRAMUSCULAR; INTRAVENOUS at 12:31

## 2019-07-01 NOTE — PROGRESS NOTES
Subjective   Esequiel Figueroa is a 39 y.o. male.   Recurrent epistaxis  History of Present Illness   Patient developed nosebleed yesterday stopped spontaneously and it recurred his got a both sides the nose the morning the left and right is also been posterior.  He was seen in the office today and 2 separate attempts at packing could not control the bleeding appeared to be posterior on the left though he had some coming of the right as well may have been coming around he denies any issues that might have precipitated this getting worse and is been unable to be controlled in the office except for short periods of time      The following portions of the patient's history were reviewed and updated as appropriate: allergies, current medications, past family history, past medical history, past social history, past surgical history and problem list.      Esequiel Figueroa reports that he has quit smoking. He has never used smokeless tobacco. He reports that he drinks alcohol. He reports that he does not use drugs.  Patient is not a tobacco user and has been counseled for use of tobacco products    Family History   Problem Relation Age of Onset   • Thyroid disease Mother          Current Outpatient Medications:   •  amphetamine-dextroamphetamine (ADDERALL) 30 MG tablet, Take 30 mg by mouth Daily., Disp: , Rfl:   •  atenolol (TENORMIN) 25 MG tablet, Take 25 mg by mouth Daily., Disp: , Rfl:   •  cetirizine (zyrTEC) 10 MG tablet, Take 10 mg by mouth Every Night., Disp: , Rfl:   •  DULOXETINE HCL PO, Take 90 mg by mouth Daily., Disp: , Rfl:   •  HYDROmorphone (DILAUDID) 2 MG tablet, Take 1 tablet by mouth Every 4 (Four) Hours As Needed for Moderate Pain **Hold if too drowsy**, Disp: 18 tablet, Rfl: 0  •  lisinopril (PRINIVIL,ZESTRIL) 10 MG tablet, Take 10 mg by mouth Daily., Disp: , Rfl:   •  loperamide (IMODIUM) 2 MG capsule, Take 2 mg by mouth 4 (Four) Times a Day As Needed for Diarrhea., Disp: , Rfl:   •   montelukast (SINGULAIR) 10 MG tablet, Take 10 mg by mouth Daily., Disp: , Rfl:   •  omeprazole (priLOSEC) 40 MG capsule, Take 40 mg by mouth Daily., Disp: , Rfl:   •  pregabalin (LYRICA) 200 MG capsule, Take 200 mg by mouth 3 (Three) Times a Day., Disp: , Rfl:   •  sertraline (ZOLOFT) 100 MG tablet, Take 100 mg by mouth Every Night., Disp: , Rfl:   •  SUMAtriptan (IMITREX) 100 MG tablet, Take 100 mg by mouth Every 2 (Two) Hours As Needed for Migraine. Take one tablet at onset of headache. May repeat dose one time in 2 hours if headache not relieved., Disp: , Rfl:   •  tapentadol (NUCYNTA) 50 MG tablet, Take 100 mg by mouth Every 6 (Six) Hours As Needed., Disp: , Rfl:   •  traZODone (DESYREL) 25 MG half tablet, Take 25 mg by mouth Every Night., Disp: , Rfl:     Allergies   Allergen Reactions   • Septra [Sulfamethoxazole-Trimethoprim] Anaphylaxis   • Other Other (See Comments)     MRSA hx   • Penicillins Hives   • Vicodin [Hydrocodone-Acetaminophen] Hives       Past Medical History:   Diagnosis Date   • ADHD    • Arthritis    • Asthma    • Gastritis    • Head injury     sports injurys and also from time in [place]   • Hypertension    • IBS (irritable bowel syndrome)    • Kidney stone    • Migraines    • Nasal defect    • Neuropathy    • PTSD (post-traumatic stress disorder)    • Sleep apnea        Past Surgical History:   Procedure Laterality Date   • APPENDECTOMY     • CARPAL TUNNEL RELEASE Bilateral    • KNEE ARTHROSCOPY     • NASAL SEPTOPLASTY W/ TURBINOPLASTY N/A 5/24/2019    Procedure: NASAL VALVE REPAIR , NASAL SEPTAL SURGERY, AND BILATERAL TURBINATE SURGERY;  Surgeon: Jt Chavez MD;  Location: [facility];  Service: ENT   • PLANTAR FASCIA RELEASE     • ROTATOR CUFF REPAIR Right    • TRIGGER FINGER RELEASE     • ULNAR NERVE REPAIR Bilateral    • WRIST SURGERY         Review of Systems   Constitutional: Negative for fever.   HENT: Negative for congestion, facial swelling, sinus pressure and sinus pain.     Hematological: Negative for adenopathy.           Objective   Physical Exam   Constitutional: He is oriented to person, place, and time. He appears well-developed and well-nourished.   HENT:   Head: Normocephalic and atraumatic.   Right Ear: Hearing, tympanic membrane, external ear and ear canal normal.   Left Ear: Hearing, tympanic membrane, external ear and ear canal normal.   Nose: Nose normal. No mucosal edema, rhinorrhea, nasal deformity or septal deviation. No epistaxis. Right sinus exhibits no maxillary sinus tenderness and no frontal sinus tenderness. Left sinus exhibits no maxillary sinus tenderness and no frontal sinus tenderness.       Mouth/Throat: Uvula is midline and oropharynx is clear and moist. No trismus in the jaw. Normal dentition. No oropharyngeal exudate or posterior oropharyngeal edema.       Eyes: Conjunctivae are normal.   Neck: Normal range of motion. Neck supple. No JVD present. No tracheal deviation present. No thyromegaly present.   Cardiovascular: Normal rate.   Pulmonary/Chest: Effort normal.   Musculoskeletal: Normal range of motion.   Lymphadenopathy:        Head (right side): No submental, no submandibular, no tonsillar, no preauricular, no posterior auricular and no occipital adenopathy present.        Head (left side): No submental, no submandibular, no tonsillar, no preauricular, no posterior auricular and no occipital adenopathy present.     He has no cervical adenopathy.        Right cervical: No superficial cervical, no deep cervical and no posterior cervical adenopathy present.       Left cervical: No superficial cervical, no deep cervical and no posterior cervical adenopathy present.   Neurological: He is alert and oriented to person, place, and time. No cranial nerve deficit.   Skin: Skin is warm.   Psychiatric: He has a normal mood and affect. His speech is normal and behavior is normal. Thought content normal.   Nursing note and vitals reviewed.    2 separate attempts  of posterior packing were carried out with Surgicel decongestant and the bleeding was too brisk to control initially that stopped that recurred again so decision was made to take the patient operating room he tolerated the procedure well and no complications of packing but it did not control the bleeding continue bleeding down the back of his throat and also around to the right side from the left      Assessment/Plan   Esequiel was seen today for nose bleed.    Diagnoses and all orders for this visit:    Epistaxis  -     CBC & Differential; Future  -     Protime-INR  -     aPTT    Plan endoscopic attempt to control bleeding gave no guarantees this would stop the bleeding much more likely to than the packing which is not controlled his bleeding he had intermittent control bleeding persisted.  Nasal endoscopy was done in the office no site could easily be identified.  There is no known reason for him to have this surgery was over a month ago and he been doing fine he is breathing better the surgery but this came on spontaneously he denies any trauma or any medication she may have precipitated this he does have a history of hypertension.  Due to inability to control this in the office plan is for endoscopic control in the operating room the patient is in agreement with this approach and understands the risk benefits including the risk of failure

## 2019-07-01 NOTE — TELEPHONE ENCOUNTER
----- Message from Seth Morgan sent at 7/1/2019  8:22 AM CDT -----  Contact: [phone]  Esequiel called this morning to say that last night, 06/30/19, his nose started bleeding; he got up this morning and it started to bleed again and is currently bleeding. Said that it is his left nostril.  He wants to know what he should do about it or does he need to be seen.    Esequiel [phone]

## 2019-07-01 NOTE — ANESTHESIA POSTPROCEDURE EVALUATION
Patient: Esequiel Figueroa    Procedure Summary     Date:  07/01/19 Room / Location:  [facility] OR  / [facility] OR    Anesthesia Start:  1225 Anesthesia Stop:  1319    Procedure:  NASAL ENDOSCOPY WITH CONTROL OF NASAL BLEEDING VIA CAUTERY (Bilateral Nose) Diagnosis:       Recurrent epistaxis      (Recurrent epistaxis [R04.0])    Surgeon:  Jt Chavez MD Provider:  Albaro Patterson MD    Anesthesia Type:  general ASA Status:  3          Anesthesia Type: general  Last vitals  BP   117/80 (07/01/19 1204)   Temp   97.5 °F (36.4 °C) (07/01/19 1204)   Pulse   119 (07/01/19 1204)   Resp   16 (07/01/19 1204)     SpO2   94 % (07/01/19 1204)     Post Anesthesia Care and Evaluation    Patient location during evaluation: PACU  Patient participation: complete - patient participated  Level of consciousness: awake  Pain score: 0  Pain management: adequate  Airway patency: patent  Anesthetic complications: No anesthetic complications  PONV Status: none  Cardiovascular status: acceptable  Respiratory status: acceptable  Hydration status: acceptable

## 2019-07-01 NOTE — ANESTHESIA PREPROCEDURE EVALUATION
Anesthesia Evaluation     Patient summary reviewed   NPO Solid Status: > 8 hours  NPO Liquid Status: > 8 hours           Airway   Mallampati: II  TM distance: >3 FB  Neck ROM: full  Possible difficult intubation and Large neck circumference  Dental    (+) poor dentition    Pulmonary - normal exam   (+) asthma, sleep apnea,   Cardiovascular - normal exam  Exercise tolerance: good (4-7 METS)    NYHA Classification: II  Patient on routine beta blocker    (+) hypertension, hyperlipidemia,       Neuro/Psych  (+) headaches, psychiatric history Anxiety,     GI/Hepatic/Renal/Endo    (+)  GERD poorly controlled,      ROS Comment: Full stomach precautions given the fact that the patient has been swallowing blood since yesterday.    Musculoskeletal     Abdominal    Substance History      OB/GYN          Other   (+) arthritis                       Anesthesia Plan    ASA 3     general   Rapid sequence(Given the swallowed blood now in the stomach and the toast this am, would recommend rsi with cricoid pressure.  Patient understands the risks of possible aspiration and the anti aspiration maneuvers to be taken in the OR.  )  intravenous induction   Anesthetic plan, all risks, benefits, and alternatives have been provided, discussed and informed consent has been obtained with: patient.

## 2019-07-01 NOTE — H&P
Esequiel Figueroa is a 39 y.o. male.   Recurrent epistaxis  History of Present Illness   Patient developed nosebleed yesterday stopped spontaneously and it recurred his got a both sides the nose the morning the left and right is also been posterior.  He was seen in the office today and 2 separate attempts at packing could not control the bleeding appeared to be posterior on the left though he had some coming of the right as well may have been coming around he denies any issues that might have precipitated this getting worse and is been unable to be controlled in the office except for short periods of time        The following portions of the patient's history were reviewed and updated as appropriate: allergies, current medications, past family history, past medical history, past social history, past surgical history and problem list.        Esequiel Figueroa reports that he has quit smoking. He has never used smokeless tobacco. He reports that he drinks alcohol. He reports that he does not use drugs.  Patient is not a tobacco user and has been counseled for use of tobacco products           Family History   Problem Relation Age of Onset   • Thyroid disease Mother              Current Outpatient Medications:   •  amphetamine-dextroamphetamine (ADDERALL) 30 MG tablet, Take 30 mg by mouth Daily., Disp: , Rfl:   •  atenolol (TENORMIN) 25 MG tablet, Take 25 mg by mouth Daily., Disp: , Rfl:   •  cetirizine (zyrTEC) 10 MG tablet, Take 10 mg by mouth Every Night., Disp: , Rfl:   •  DULOXETINE HCL PO, Take 90 mg by mouth Daily., Disp: , Rfl:   •  HYDROmorphone (DILAUDID) 2 MG tablet, Take 1 tablet by mouth Every 4 (Four) Hours As Needed for Moderate Pain **Hold if too drowsy**, Disp: 18 tablet, Rfl: 0  •  lisinopril (PRINIVIL,ZESTRIL) 10 MG tablet, Take 10 mg by mouth Daily., Disp: , Rfl:   •  loperamide (IMODIUM) 2 MG capsule, Take 2 mg by mouth 4 (Four) Times a Day As Needed for Diarrhea., Disp: , Rfl:   •   montelukast (SINGULAIR) 10 MG tablet, Take 10 mg by mouth Daily., Disp: , Rfl:   •  omeprazole (priLOSEC) 40 MG capsule, Take 40 mg by mouth Daily., Disp: , Rfl:   •  pregabalin (LYRICA) 200 MG capsule, Take 200 mg by mouth 3 (Three) Times a Day., Disp: , Rfl:   •  sertraline (ZOLOFT) 100 MG tablet, Take 100 mg by mouth Every Night., Disp: , Rfl:   •  SUMAtriptan (IMITREX) 100 MG tablet, Take 100 mg by mouth Every 2 (Two) Hours As Needed for Migraine. Take one tablet at onset of headache. May repeat dose one time in 2 hours if headache not relieved., Disp: , Rfl:   •  tapentadol (NUCYNTA) 50 MG tablet, Take 100 mg by mouth Every 6 (Six) Hours As Needed., Disp: , Rfl:   •  traZODone (DESYREL) 25 MG half tablet, Take 25 mg by mouth Every Night., Disp: , Rfl:            Allergies   Allergen Reactions   • Septra [Sulfamethoxazole-Trimethoprim] Anaphylaxis   • Other Other (See Comments)       MRSA hx   • Penicillins Hives   • Vicodin [Hydrocodone-Acetaminophen] Hives         Medical History        Past Medical History:   Diagnosis Date   • ADHD     • Arthritis     • Asthma     • Gastritis     • Head injury       sports injurys and also from time in [place]   • Hypertension     • IBS (irritable bowel syndrome)     • Kidney stone     • Migraines     • Nasal defect     • Neuropathy     • PTSD (post-traumatic stress disorder)     • Sleep apnea              Surgical History   Past Surgical History:   Procedure Laterality Date   • APPENDECTOMY       • CARPAL TUNNEL RELEASE Bilateral     • KNEE ARTHROSCOPY       • NASAL SEPTOPLASTY W/ TURBINOPLASTY N/A 5/24/2019     Procedure: NASAL VALVE REPAIR , NASAL SEPTAL SURGERY, AND BILATERAL TURBINATE SURGERY;  Surgeon: Jt Chavez MD;  Location: [facility];  Service: ENT   • PLANTAR FASCIA RELEASE       • ROTATOR CUFF REPAIR Right     • TRIGGER FINGER RELEASE       • ULNAR NERVE REPAIR Bilateral     • WRIST SURGERY                Review of Systems   Constitutional: Negative for  fever.   HENT: Negative for congestion, facial swelling, sinus pressure and sinus pain.    Hematological: Negative for adenopathy.                    Objective      Physical Exam   Constitutional: He is oriented to person, place, and time. He appears well-developed and well-nourished.   HENT:   Head: Normocephalic and atraumatic.   Right Ear: Hearing, tympanic membrane, external ear and ear canal normal.   Left Ear: Hearing, tympanic membrane, external ear and ear canal normal.   Nose: Nose normal. No mucosal edema, rhinorrhea, nasal deformity or septal deviation. No epistaxis. Right sinus exhibits no maxillary sinus tenderness and no frontal sinus tenderness. Left sinus exhibits no maxillary sinus tenderness and no frontal sinus tenderness.       Mouth/Throat: Uvula is midline and oropharynx is clear and moist. No trismus in the jaw. Normal dentition. No oropharyngeal exudate or posterior oropharyngeal edema.       Eyes: Conjunctivae are normal.   Neck: Normal range of motion. Neck supple. No JVD present. No tracheal deviation present. No thyromegaly present.   Cardiovascular: Normal rate.   Pulmonary/Chest: Effort normal.   Musculoskeletal: Normal range of motion.   Lymphadenopathy:        Head (right side): No submental, no submandibular, no tonsillar, no preauricular, no posterior auricular and no occipital adenopathy present.        Head (left side): No submental, no submandibular, no tonsillar, no preauricular, no posterior auricular and no occipital adenopathy present.     He has no cervical adenopathy.        Right cervical: No superficial cervical, no deep cervical and no posterior cervical adenopathy present.       Left cervical: No superficial cervical, no deep cervical and no posterior cervical adenopathy present.   Neurological: He is alert and oriented to person, place, and time. No cranial nerve deficit.   Skin: Skin is warm.   Psychiatric: He has a normal mood and affect. His speech is normal and  behavior is normal. Thought content normal.   Nursing note and vitals reviewed.                      Assessment/Plan      Esequiel was seen today for nose bleed.     Diagnoses and all orders for this visit:     Epistaxis  -     CBC & Differential; Future  -     Protime-INR  -     aPTT     Plan endoscopic attempt to control bleeding gave no guarantees this would stop the bleeding much more likely to than the packing which is not controlled his bleeding he had intermittent control bleeding persisted.  Nasal endoscopy was done in the office no site could easily be identified.  There is no known reason for him to have this surgery was over a month ago and he been doing fine he is breathing better the surgery but this came on spontaneously he denies any trauma or any medication she may have precipitated this he does have a history of hypertension.  Due to inability to control this in the office plan is for endoscopic control in the operating room the patient is in agreement with this approach and understands the risk benefits including the risk of failure

## 2019-07-01 NOTE — OP NOTE
OPERATIVE NOTE    Name:    Esequiel Figueroa  YOB: 1980  Date of surgery:   7/1/2019    Pre-op Diagnosis:   Recurrent epistaxis [R04.0]    Post-op Diagnosis:    Post-Op Diagnosis Codes:     * Recurrent epistaxis [R04.0]    Procedure:  Procedure(s):  NASAL ENDOSCOPY WITH CONTROL OF NASAL BLEEDING VIA CAUTERY    Surgeon:  Jt Chavez MD, AAOHNS    Anesthesia: General    Staff:   Circulator: Jessy Mccray RN  Scrub Person: Destiny Mo  Assistant: Val Barrera    Estimated Blood Loss: 15 ml    Specimens:                None      Drains: None    Findings: Small amount of bleeding from left anterior turbinate and posterior nasal wall adjacent to inferior turbinate on the left otherwise healing well    Complications: None    IMPLANTS:   Nothing was implanted during the procedure    INDICATIONS: Patient had acute onset of nosebleed yesterday that rebleed today is having significant bleeding comes in the office attempts packing the office were unable to control the bleeding into the patient preferred to have this done under general anesthesia it was a very posterior bleeds and was difficult to see in pack attempt to be packed with Surgicel that did not control the bleeding.  The risk of surgery including need for further intervention was later if this does not control the bleeding were discussed all questions were answered and she was made for this approach.  CBC is pending the TTE is slightly elevated.  The patient denied any other new medical issues or drugs may contribute to his symptoms    PROCEDURE: Patient was taken operating room placed supine position general anesthesia was carried out.  After some difficulty with intubation he was anesthetized.  And then the nose was evaluated utilizing 30 degree endoscope there is only seem to be some slight bruising coming from the posterior lateral nasal wall adjacent to the posterior inferior turbinate is also a small area which may be  just an abrasion anterior portion of the turbinate has had not been bleeding previously both of these areas were cauterized and injected with local anesthetic with epinephrine and then Surgicel and FloSeal placed over the top of the right side was the nose was also thoroughly evaluated no evidence of infection was noted ostiomeatal meatal units were clear.  And the procedure is observed no significant bleeding noted the stomach was suctioned per anesthesia            This document has been electronically signed by Jt Chavez MD on July 1, 2019 1:01 PM

## 2019-07-01 NOTE — ANESTHESIA PROCEDURE NOTES
Airway  Urgency: elective    Date/Time: 7/1/2019 12:34 PM  End Time:7/1/2019 12:35 PM  Airway not difficult    General Information and Staff    Patient location during procedure: OR  CRNA: Luann Torres CRNA    Indications and Patient Condition  Indications for airway management: airway protection    Preoxygenated: yes  Mask difficulty assessment: 1 - vent by mask    Final Airway Details  Final airway type: endotracheal airway      Successful airway: ETT  Cuffed: yes   Successful intubation technique: video laryngoscopy  Facilitating devices/methods: cricoid pressure  Endotracheal tube insertion site: oral  Blade: Ria  Blade size: 4  ETT size (mm): 7.0  Cormack-Lehane Classification: grade I - full view of glottis  Placement verified by: chest auscultation and capnometry   Cuff volume (mL): 10  Measured from: lips  ETT to lips (cm): 22  Number of attempts at approach: 2

## 2019-07-01 NOTE — PATIENT INSTRUCTIONS

## 2019-07-01 NOTE — POST-PROCEDURE NOTE
Patient had no significant bleeding orally or through his nose.    I reviewed the surgical findings  .  Says he is having packing his nose I placed him on oral antibiotic is not penicillin related.  I explained to him since he lives so far away with think we should observe him overnight and he can begin irrigation with minimal activity tomorrow.  He understands all questions answered he is not having much pain or discomfort and has no evidence of complication.  FEROZ Chavez MD

## 2019-07-02 VITALS
BODY MASS INDEX: 38.93 KG/M2 | TEMPERATURE: 96 F | HEIGHT: 70 IN | SYSTOLIC BLOOD PRESSURE: 118 MMHG | WEIGHT: 271.9 LBS | RESPIRATION RATE: 18 BRPM | HEART RATE: 85 BPM | DIASTOLIC BLOOD PRESSURE: 63 MMHG | OXYGEN SATURATION: 94 %

## 2019-07-02 PROCEDURE — 99024 POSTOP FOLLOW-UP VISIT: CPT | Performed by: OTOLARYNGOLOGY

## 2019-07-02 RX ORDER — CLARITHROMYCIN 500 MG/1
500 TABLET, COATED ORAL 2 TIMES DAILY
Qty: 8 TABLET | Refills: 0 | Status: SHIPPED | OUTPATIENT
Start: 2019-07-02 | End: 2019-07-06

## 2019-07-02 RX ADMIN — LISINOPRIL 10 MG: 10 TABLET ORAL at 08:42

## 2019-07-02 RX ADMIN — PANTOPRAZOLE SODIUM 40 MG: 40 TABLET, DELAYED RELEASE ORAL at 05:47

## 2019-07-02 RX ADMIN — PREGABALIN 200 MG: 75 CAPSULE ORAL at 09:52

## 2019-07-02 RX ADMIN — CLINDAMYCIN HYDROCHLORIDE 300 MG: 150 CAPSULE ORAL at 05:47

## 2019-07-02 RX ADMIN — ATENOLOL 25 MG: 25 TABLET ORAL at 08:42

## 2019-07-02 NOTE — DISCHARGE INSTRUCTIONS
No lifting bending or straining for 1 week  No nose blowing for 1 week  Begin saline irrigation as before tomorrow morning  Call if recurrent bleeding and come to the emergency room [place]

## 2019-07-02 NOTE — DISCHARGE SUMMARY
Date of Discharge:  7/2/2019    Discharge Diagnosis: Axis    Presenting Problem/History of Present Illness  Active Hospital Problems    Diagnosis  POA   • **Recurrent epistaxis [R04.0]  Yes   • Epistaxis [R04.0]  Yes      Resolved Hospital Problems   No resolved problems to display.           Hospital Course  Patient is a 39 y.o. male presented with patient was admitted to the hospital for operative intervention because posterior packing of the neck and control his nasal bleeding.  Her with the procedure without complication had no syncopal bleeding overnight.  He had no complaints significant pain    Procedures Performed    Procedure(s):  NASAL ENDOSCOPY WITH CONTROL OF NASAL BLEEDING VIA CAUTERY  -------------------       Consults:   Consults     No orders found for last 30 day(s).          Pertinent Test Results: NH normal    Condition on Discharge:   Improved    Vital Signs  Temp:  [96 °F (35.6 °C)-98 °F (36.7 °C)] 96 °F (35.6 °C)  Heart Rate:  [] 75  Resp:  [16-20] 18  BP: (108-145)/(58-83) 122/83    Physical Exam: Nasal exam reveals no active bleeding with Surgicel still in place.  Oropharynx reveals no active bleeding pharynx  Facial and nasal exam reveals no swelling    Has normal breathing and voice        Discharge Disposition  Home or Self Care    Discharge Medications     Discharge Medications      New Medications      Instructions Start Date   clarithromycin 500 MG tablet  Commonly known as:  BIAXIN   500 mg, Oral, 2 Times Daily, Take with food and probiotics and call office and stop if develop watery diarrhea.         Continue These Medications      Instructions Start Date   amphetamine-dextroamphetamine 30 MG tablet  Commonly known as:  ADDERALL   30 mg, Oral, Daily      atenolol 25 MG tablet  Commonly known as:  TENORMIN   25 mg, Oral, Daily      cetirizine 10 MG tablet  Commonly known as:  zyrTEC   10 mg, Oral, Nightly      DULOXETINE HCL PO   90 mg, Oral, Daily      lisinopril 10 MG  tablet  Commonly known as:  PRINIVIL,ZESTRIL   10 mg, Oral, Daily      loperamide 2 MG capsule  Commonly known as:  IMODIUM   2 mg, Oral, 4 Times Daily PRN      LYRICA 200 MG capsule  Generic drug:  pregabalin   200 mg, Oral, 3 Times Daily      montelukast 10 MG tablet  Commonly known as:  SINGULAIR   10 mg, Oral, Daily      sertraline 100 MG tablet  Commonly known as:  ZOLOFT   100 mg, Oral, Nightly      SUMAtriptan 100 MG tablet  Commonly known as:  IMITREX   100 mg, Oral, Every 2 Hours PRN, Take one tablet at onset of headache. May repeat dose one time in 2 hours if headache not relieved.       tapentadol 50 MG tablet  Commonly known as:  NUCYNTA   100 mg, Oral, Every 6 Hours PRN      traZODone 25 MG half tablet  Commonly known as:  DESYREL   25 mg, Oral, Nightly         Stop These Medications    HYDROmorphone 2 MG tablet  Commonly known as:  DILAUDID        ASK your doctor about these medications      Instructions Start Date   omeprazole 40 MG capsule  Commonly known as:  priLOSEC   40 mg, Oral, Daily             Discharge Diet: Regular diet    Activity at Discharge: Lifting straining or nose blowing for 1 week  Follow-up Appointments  Future Appointments   Date Time Provider Department Center   7/31/2019 10:15 AM Jt Chavez [place] [facility] [place]     Additional Instructions for the Follow-ups that You Need to Schedule     Call MD With Problems / Concerns   As directed      Call if significant oral or nasal bleeding    Order Comments:  Call if significant oral or nasal bleeding          Discharge Follow-up with Specified Provider: Dr. Chavez   As directed      To:  Dr. Chavez    Follow Up Details:  8 Days from today at 4 PM and up until office           he has nasal irrigation set at home and will begin irrigating tomorrow morning but is not to do that today he should ideally sleep his head elevated for 1 week    Test Results Pending at Discharge  NA       Jt Chavez MD  07/02/19  6:33 AM    Time: 30  minutes

## 2019-07-10 ENCOUNTER — OFFICE VISIT (OUTPATIENT)
Dept: OTOLARYNGOLOGY | Facility: CLINIC | Age: 39
End: 2019-07-10

## 2019-07-10 VITALS
BODY MASS INDEX: 37.94 KG/M2 | WEIGHT: 265 LBS | TEMPERATURE: 97.8 F | HEIGHT: 70 IN | HEART RATE: 118 BPM | OXYGEN SATURATION: 97 %

## 2019-07-10 DIAGNOSIS — Z09 POSTOP CHECK: Primary | ICD-10-CM

## 2019-07-10 PROCEDURE — 99024 POSTOP FOLLOW-UP VISIT: CPT | Performed by: OTOLARYNGOLOGY

## 2019-07-10 NOTE — PROGRESS NOTES
Comes back after his nosebleed she does not have any discomfort just feels somewhat stopped up is been rinsing.  Is not any significant bleeding.  Endoscopic evaluate his nose removed a large amount of FloSeal some old clots but there is no active bleeding he is healing well.    We will get a recheck in 2 weeks he can increase his activity level he is to continue irrigating at least twice a day and call if any question or problems discussed if there is any persistent nasal obstruction he is to let me know on follow-up but otherwise we will see him back as noted  FEROZ Chavez MD

## 2019-07-10 NOTE — PATIENT INSTRUCTIONS

## 2019-07-31 ENCOUNTER — OFFICE VISIT (OUTPATIENT)
Dept: OTOLARYNGOLOGY | Facility: CLINIC | Age: 39
End: 2019-07-31

## 2019-07-31 VITALS — WEIGHT: 268 LBS | HEIGHT: 70 IN | BODY MASS INDEX: 38.37 KG/M2 | TEMPERATURE: 98.1 F

## 2019-07-31 DIAGNOSIS — Z09 POSTOP CHECK: Primary | ICD-10-CM

## 2019-07-31 PROCEDURE — 99024 POSTOP FOLLOW-UP VISIT: CPT | Performed by: OTOLARYNGOLOGY

## 2019-07-31 NOTE — PATIENT INSTRUCTIONS

## 2019-07-31 NOTE — PROGRESS NOTES
Patient is healing well after surgery and is breathing better.  He said no further bleeding.    He occasionally has some crusting notes but this is getting less.    Examination reveals his nose to be healing well there is minimal crusting on the inferior turbinate on the left but much improved.    If he works at [employer] can cause his nose clasp he does not feel like it is causing breathing problem I suggested follow-up in 6 weeks we discussed other therapies to consider treatment that we will try conservative approach she is agree with this and all questions answered.    FLOR Chavez MD

## 2020-01-01 ENCOUNTER — EXTERNAL RECORD (OUTPATIENT)
Dept: OTHER | Age: 40
End: 2020-01-01

## 2020-05-12 ENCOUNTER — TELEPHONE (OUTPATIENT)
Dept: NEUROLOGY | Age: 40
End: 2020-05-12

## 2020-05-13 DIAGNOSIS — G43.109 MIGRAINE WITH AURA, NOT INTRACTABLE, WITHOUT STATUS MIGRAINOSUS: Primary | ICD-10-CM

## 2020-06-15 ENCOUNTER — CLINICAL ABSTRACT (OUTPATIENT)
Dept: NEUROLOGY | Age: 40
End: 2020-06-15

## 2020-06-15 RX ORDER — DULOXETIN HYDROCHLORIDE 60 MG/1
60 CAPSULE, DELAYED RELEASE ORAL 2 TIMES DAILY
COMMUNITY

## 2020-06-15 RX ORDER — SIMETHICONE 80 MG
80 TABLET,CHEWABLE ORAL 3 TIMES DAILY PRN
COMMUNITY

## 2020-06-15 RX ORDER — CETIRIZINE HYDROCHLORIDE 10 MG/1
10 TABLET ORAL DAILY PRN
COMMUNITY

## 2020-06-15 RX ORDER — ATENOLOL 50 MG/1
25 TABLET ORAL DAILY
COMMUNITY
End: 2021-10-01 | Stop reason: DRUGHIGH

## 2020-06-15 RX ORDER — NALOXONE HYDROCHLORIDE 4 MG/.1ML
4 SPRAY NASAL ONCE
COMMUNITY

## 2020-06-15 RX ORDER — PREGABALIN 200 MG/1
200 CAPSULE ORAL 3 TIMES DAILY
COMMUNITY

## 2020-06-15 RX ORDER — NICOTINE POLACRILEX 4 MG/1
40 GUM, CHEWING ORAL DAILY
COMMUNITY
End: 2021-02-09 | Stop reason: DRUGHIGH

## 2020-06-15 RX ORDER — MONTELUKAST SODIUM 10 MG/1
10 TABLET ORAL NIGHTLY
COMMUNITY

## 2020-06-15 RX ORDER — UREA 200 MG/G
GEL TOPICAL
COMMUNITY

## 2020-06-15 RX ORDER — TRAZODONE HYDROCHLORIDE 50 MG/1
50 TABLET ORAL AT BEDTIME
COMMUNITY

## 2020-06-15 RX ORDER — SUMATRIPTAN 100 MG/1
TABLET, FILM COATED ORAL
COMMUNITY

## 2020-06-15 RX ORDER — DICYCLOMINE HYDROCHLORIDE 10 MG/1
10 CAPSULE ORAL 3 TIMES DAILY
COMMUNITY

## 2020-06-15 RX ORDER — LISINOPRIL 20 MG/1
10 TABLET ORAL DAILY
COMMUNITY
End: 2022-12-27 | Stop reason: DRUGHIGH

## 2020-06-15 RX ORDER — CHOLESTYRAMINE LIGHT 4 G/5.7G
POWDER, FOR SUSPENSION ORAL
COMMUNITY

## 2020-06-16 ENCOUNTER — TELEPHONE (OUTPATIENT)
Dept: NEUROLOGY | Age: 40
End: 2020-06-16

## 2020-06-18 ENCOUNTER — OFFICE VISIT (OUTPATIENT)
Dept: NEUROLOGY | Age: 40
End: 2020-06-18

## 2020-06-18 ENCOUNTER — TELEPHONE (OUTPATIENT)
Dept: NEUROLOGY | Age: 40
End: 2020-06-18

## 2020-06-18 DIAGNOSIS — G43.719 INTRACTABLE CHRONIC MIGRAINE WITHOUT AURA AND WITHOUT STATUS MIGRAINOSUS: Primary | ICD-10-CM

## 2020-06-18 PROCEDURE — 99204 OFFICE O/P NEW MOD 45 MIN: CPT | Performed by: NURSE PRACTITIONER

## 2020-06-18 RX ORDER — BUPROPION HYDROCHLORIDE 150 MG/1
300 TABLET ORAL DAILY
COMMUNITY
End: 2021-02-09 | Stop reason: DRUGHIGH

## 2020-06-18 ASSESSMENT — ANXIETY QUESTIONNAIRES
6. BECOMING EASILY ANNOYED OR IRRITABLE: NEARLY EVERY DAY
5. BEING SO RESTLESS THAT IT IS HARD TO SIT STILL: 1
6. BECOMING EASILY ANNOYED OR IRRITABLE: 3
1. FEELING NERVOUS, ANXIOUS, OR ON EDGE: NEARLY EVERY DAY
IF YOU CHECKED OFF ANY PROBLEMS ON THIS QUESTIONNAIRE, HOW DIFFICULT HAVE THESE PROBLEMS MADE IT FOR YOU TO DO YOUR WORK, TAKE CARE OF THINGS AT HOME, OR GET ALONG WITH OTHER PEOPLE: NOT DIFFICULT AT ALL
3. WORRYING TOO MUCH ABOUT DIFFERENT THINGS: 3
1. FEELING NERVOUS, ANXIOUS, OR ON EDGE: 3
4. TROUBLE RELAXING: 2
2. NOT BEING ABLE TO STOP OR CONTROL WORRYING: 2
4. TROUBLE RELAXING: MORE THAN HALF THE DAYS
7. FEELING AFRAID AS IF SOMETHING AWFUL MIGHT HAPPEN: 0
2. NOT BEING ABLE TO STOP OR CONTROL WORRYING: MORE THAN HALF THE DAYS
3. WORRYING TOO MUCH ABOUT DIFFERENT THINGS: NEARLY EVERY DAY
GAD7 TOTAL SCORE: 14
7. FEELING AFRAID AS IF SOMETHING AWFUL MIGHT HAPPEN: NOT AT ALL
5. BEING SO RESTLESS THAT IT IS HARD TO SIT STILL: SEVERAL DAYS

## 2020-06-18 ASSESSMENT — PATIENT HEALTH QUESTIONNAIRE - PHQ9
SUM OF ALL RESPONSES TO PHQ9 QUESTIONS 1 TO 9: 8
10. IF YOU CHECKED OFF ANY PROBLEMS, HOW DIFFICULT HAVE THESE PROBLEMS MADE IT FOR YOU TO DO YOUR WORK, TAKE CARE OF THINGS AT HOME, OR GET ALONG WITH OTHER PEOPLE: NOT DIFFICULT AT ALL
7. TROUBLE CONCENTRATING ON THINGS, SUCH AS READING THE NEWSPAPER OR WATCHING TELEVISION: NOT AT ALL
CLINICAL INTERPRETATION OF PHQ9 SCORE: FURTHER SCREENING NEEDED
CLINICAL INTERPRETATION OF PHQ9 SCORE: MILD DEPRESSION
9. THOUGHTS THAT YOU WOULD BE BETTER OFF DEAD, OR OF HURTING YOURSELF: NOT AT ALL
CLINICAL INTERPRETATION OF PHQ2 SCORE: MILD DEPRESSION
5. POOR APPETITE, WEIGHT LOSS, OR OVEREATING: NOT AT ALL
1. LITTLE INTEREST OR PLEASURE IN DOING THINGS: SEVERAL DAYS
SUM OF ALL RESPONSES TO PHQ QUESTIONS 1-9: 8
SUM OF ALL RESPONSES TO PHQ9 QUESTIONS 1 AND 2: 3
CLINICAL INTERPRETATION OF PHQ2 SCORE: FURTHER SCREENING NEEDED
8. MOVING OR SPEAKING SO SLOWLY THAT OTHER PEOPLE COULD HAVE NOTICED. OR THE OPPOSITE, BEING SO FIGETY OR RESTLESS THAT YOU HAVE BEEN MOVING AROUND A LOT MORE THAN USUAL: NOT AT ALL
6. FEELING BAD ABOUT YOURSELF - OR THAT YOU ARE A FAILURE OR HAVE LET YOURSELF OR YOUR FAMILY DOWN: MORE THAN HALF THE DAYS
2. FEELING DOWN, DEPRESSED OR HOPELESS: MORE THAN HALF THE DAYS
4. FEELING TIRED OR HAVING LITTLE ENERGY: MORE THAN HALF THE DAYS
SUM OF ALL RESPONSES TO PHQ9 QUESTIONS 1 AND 2: 3
3. TROUBLE FALLING OR STAYING ASLEEP OR SLEEPING TOO MUCH: SEVERAL DAYS

## 2020-06-18 ASSESSMENT — PAIN SCALES - GENERAL: PAINLEVEL: 5-6

## 2020-06-26 ENCOUNTER — TELEPHONE (OUTPATIENT)
Dept: NEUROLOGY | Age: 40
End: 2020-06-26

## 2020-06-29 ENCOUNTER — OFFICE VISIT (OUTPATIENT)
Dept: NEUROLOGY | Age: 40
End: 2020-06-29

## 2020-06-29 DIAGNOSIS — G43.719 INTRACTABLE CHRONIC MIGRAINE WITHOUT AURA AND WITHOUT STATUS MIGRAINOSUS: Primary | ICD-10-CM

## 2020-06-29 PROCEDURE — 64615 CHEMODENERV MUSC MIGRAINE: CPT | Performed by: NURSE PRACTITIONER

## 2020-06-29 ASSESSMENT — PAIN SCALES - GENERAL: PAINLEVEL: 5-6

## 2020-09-22 ENCOUNTER — APPOINTMENT (OUTPATIENT)
Dept: NEUROLOGY | Age: 40
End: 2020-09-22

## 2020-10-07 ENCOUNTER — APPOINTMENT (OUTPATIENT)
Dept: NEUROLOGY | Age: 40
End: 2020-10-07

## 2020-10-22 ENCOUNTER — APPOINTMENT (OUTPATIENT)
Dept: NEUROLOGY | Age: 40
End: 2020-10-22

## 2020-10-30 ENCOUNTER — TELEPHONE (OUTPATIENT)
Dept: NEUROLOGY | Age: 40
End: 2020-10-30

## 2020-10-30 ENCOUNTER — OFFICE VISIT (OUTPATIENT)
Dept: NEUROLOGY | Age: 40
End: 2020-10-30

## 2020-10-30 DIAGNOSIS — G43.719 INTRACTABLE CHRONIC MIGRAINE WITHOUT AURA AND WITHOUT STATUS MIGRAINOSUS: Primary | ICD-10-CM

## 2020-10-30 PROCEDURE — 64615 CHEMODENERV MUSC MIGRAINE: CPT | Performed by: NURSE PRACTITIONER

## 2020-10-30 ASSESSMENT — PAIN SCALES - GENERAL: PAINLEVEL: 5-6

## 2020-12-28 ENCOUNTER — TELEPHONE (OUTPATIENT)
Dept: NEUROLOGY | Age: 40
End: 2020-12-28

## 2021-01-26 ENCOUNTER — APPOINTMENT (OUTPATIENT)
Dept: NEUROLOGY | Age: 41
End: 2021-01-26

## 2021-02-04 ENCOUNTER — APPOINTMENT (OUTPATIENT)
Dept: NEUROLOGY | Age: 41
End: 2021-02-04

## 2021-02-09 ENCOUNTER — OFFICE VISIT (OUTPATIENT)
Dept: NEUROLOGY | Age: 41
End: 2021-02-09

## 2021-02-09 PROCEDURE — 64615 CHEMODENERV MUSC MIGRAINE: CPT | Performed by: NURSE PRACTITIONER

## 2021-02-09 RX ORDER — OMEPRAZOLE 40 MG/1
CAPSULE, DELAYED RELEASE ORAL
COMMUNITY
Start: 2019-12-13 | End: 2021-10-01 | Stop reason: DRUGHIGH

## 2021-02-09 RX ORDER — BUPROPION HYDROCHLORIDE 300 MG/1
400 TABLET ORAL DAILY
COMMUNITY
Start: 2020-09-11 | End: 2021-10-01 | Stop reason: DRUGHIGH

## 2021-02-09 ASSESSMENT — PAIN SCALES - GENERAL: PAINLEVEL: 3-4

## 2021-05-04 ENCOUNTER — TELEPHONE (OUTPATIENT)
Dept: NEUROLOGY | Age: 41
End: 2021-05-04

## 2021-05-05 ENCOUNTER — APPOINTMENT (OUTPATIENT)
Dept: NEUROLOGY | Age: 41
End: 2021-05-05

## 2021-05-10 ENCOUNTER — OFFICE VISIT (OUTPATIENT)
Dept: NEUROLOGY | Age: 41
End: 2021-05-10

## 2021-05-10 PROCEDURE — 64615 CHEMODENERV MUSC MIGRAINE: CPT | Performed by: NURSE PRACTITIONER

## 2021-05-10 ASSESSMENT — PAIN SCALES - GENERAL: PAINLEVEL: 5-6

## 2021-05-26 VITALS
HEIGHT: 70 IN | HEIGHT: 70 IN | DIASTOLIC BLOOD PRESSURE: 76 MMHG | WEIGHT: 283.5 LBS | SYSTOLIC BLOOD PRESSURE: 102 MMHG | HEIGHT: 70 IN | HEART RATE: 76 BPM | SYSTOLIC BLOOD PRESSURE: 120 MMHG | OXYGEN SATURATION: 96 % | OXYGEN SATURATION: 96 % | HEART RATE: 74 BPM | RESPIRATION RATE: 16 BRPM | WEIGHT: 290 LBS | HEART RATE: 84 BPM | WEIGHT: 277 LBS | WEIGHT: 272.7 LBS | SYSTOLIC BLOOD PRESSURE: 112 MMHG | BODY MASS INDEX: 40.59 KG/M2 | DIASTOLIC BLOOD PRESSURE: 78 MMHG | SYSTOLIC BLOOD PRESSURE: 106 MMHG | HEART RATE: 102 BPM | OXYGEN SATURATION: 97 % | OXYGEN SATURATION: 97 % | BODY MASS INDEX: 41.52 KG/M2 | BODY MASS INDEX: 39.65 KG/M2 | DIASTOLIC BLOOD PRESSURE: 84 MMHG | HEART RATE: 88 BPM | RESPIRATION RATE: 16 BRPM | BODY MASS INDEX: 39.99 KG/M2 | HEIGHT: 70 IN | RESPIRATION RATE: 16 BRPM | HEIGHT: 70 IN | OXYGEN SATURATION: 99 % | BODY MASS INDEX: 39.04 KG/M2 | RESPIRATION RATE: 16 BRPM | RESPIRATION RATE: 16 BRPM | SYSTOLIC BLOOD PRESSURE: 104 MMHG | WEIGHT: 279.3 LBS | DIASTOLIC BLOOD PRESSURE: 74 MMHG | DIASTOLIC BLOOD PRESSURE: 76 MMHG

## 2021-07-01 ENCOUNTER — TELEPHONE (OUTPATIENT)
Dept: NEUROLOGY | Age: 41
End: 2021-07-01

## 2021-08-03 ENCOUNTER — APPOINTMENT (OUTPATIENT)
Dept: NEUROLOGY | Age: 41
End: 2021-08-03

## 2021-08-09 ENCOUNTER — TELEPHONE (OUTPATIENT)
Dept: NEUROLOGY | Age: 41
End: 2021-08-09

## 2021-08-24 ENCOUNTER — TELEPHONE (OUTPATIENT)
Dept: NEUROLOGY | Age: 41
End: 2021-08-24

## 2021-10-01 ENCOUNTER — CLINICAL ABSTRACT (OUTPATIENT)
Dept: NEUROLOGY | Age: 41
End: 2021-10-01

## 2021-10-01 RX ORDER — CARBOXYMETHYLCELLULOSE SODIUM 10 MG/ML
1 GEL OPHTHALMIC AT BEDTIME
COMMUNITY

## 2021-10-01 RX ORDER — BUPROPION HYDROCHLORIDE 150 MG/1
450 TABLET ORAL EVERY MORNING
COMMUNITY

## 2021-10-01 RX ORDER — OMEPRAZOLE 20 MG/1
40 TABLET, DELAYED RELEASE ORAL
COMMUNITY

## 2021-10-01 RX ORDER — WATER / MINERAL OIL / WHITE PETROLATUM 16 OZ
CREAM TOPICAL PRN
COMMUNITY

## 2021-10-01 RX ORDER — ATENOLOL 25 MG/1
25 TABLET ORAL DAILY
COMMUNITY
End: 2022-10-03

## 2021-10-01 RX ORDER — SODIUM CHLORIDE/SODIUM BICARB
PACKET (EA) NASAL DAILY
COMMUNITY

## 2021-10-01 RX ORDER — FLUTICASONE PROPIONATE 50 MCG
2 SPRAY, SUSPENSION (ML) NASAL DAILY
COMMUNITY

## 2021-10-01 RX ORDER — PRENATAL VIT 91/IRON/FOLIC/DHA 28-975-200
COMBINATION PACKAGE (EA) ORAL 2 TIMES DAILY
COMMUNITY

## 2021-10-08 ENCOUNTER — OFFICE VISIT (OUTPATIENT)
Dept: NEUROLOGY | Age: 41
End: 2021-10-08

## 2021-10-08 ENCOUNTER — TELEPHONE (OUTPATIENT)
Dept: NEUROLOGY | Age: 41
End: 2021-10-08

## 2021-10-08 VITALS
OXYGEN SATURATION: 97 % | SYSTOLIC BLOOD PRESSURE: 122 MMHG | RESPIRATION RATE: 16 BRPM | BODY MASS INDEX: 40.56 KG/M2 | WEIGHT: 282.7 LBS | DIASTOLIC BLOOD PRESSURE: 66 MMHG | HEART RATE: 66 BPM

## 2021-10-08 DIAGNOSIS — G43.719 INTRACTABLE CHRONIC MIGRAINE WITHOUT AURA AND WITHOUT STATUS MIGRAINOSUS: Primary | ICD-10-CM

## 2021-10-08 PROCEDURE — 64615 CHEMODENERV MUSC MIGRAINE: CPT | Performed by: NURSE PRACTITIONER

## 2021-10-08 ASSESSMENT — PAIN SCALES - GENERAL: PAINLEVEL: 0

## 2022-01-03 ENCOUNTER — APPOINTMENT (OUTPATIENT)
Dept: NEUROLOGY | Age: 42
End: 2022-01-03

## 2022-01-04 ENCOUNTER — OFFICE VISIT (OUTPATIENT)
Dept: NEUROLOGY | Age: 42
End: 2022-01-04

## 2022-01-04 VITALS
HEIGHT: 70 IN | RESPIRATION RATE: 12 BRPM | BODY MASS INDEX: 39.77 KG/M2 | HEART RATE: 92 BPM | DIASTOLIC BLOOD PRESSURE: 78 MMHG | OXYGEN SATURATION: 97 % | WEIGHT: 277.8 LBS | SYSTOLIC BLOOD PRESSURE: 106 MMHG

## 2022-01-04 DIAGNOSIS — G43.719 INTRACTABLE CHRONIC MIGRAINE WITHOUT AURA AND WITHOUT STATUS MIGRAINOSUS: Primary | ICD-10-CM

## 2022-01-04 PROCEDURE — 99202 OFFICE O/P NEW SF 15 MIN: CPT | Performed by: PSYCHIATRY & NEUROLOGY

## 2022-01-04 PROCEDURE — 64615 CHEMODENERV MUSC MIGRAINE: CPT | Performed by: PSYCHIATRY & NEUROLOGY

## 2022-01-04 ASSESSMENT — PAIN SCALES - GENERAL: PAINLEVEL: 4

## 2022-01-07 ENCOUNTER — APPOINTMENT (OUTPATIENT)
Dept: NEUROLOGY | Age: 42
End: 2022-01-07

## 2022-03-10 ENCOUNTER — TELEPHONE (OUTPATIENT)
Dept: NEUROLOGY | Age: 42
End: 2022-03-10

## 2022-03-10 DIAGNOSIS — G43.719 INTRACTABLE CHRONIC MIGRAINE WITHOUT AURA AND WITHOUT STATUS MIGRAINOSUS: Primary | ICD-10-CM

## 2022-03-24 DIAGNOSIS — G43.709 CHRONIC MIGRAINE WITHOUT AURA, NOT INTRACTABLE, WITHOUT STATUS MIGRAINOSUS: Primary | ICD-10-CM

## 2022-04-05 ENCOUNTER — OFFICE VISIT (OUTPATIENT)
Dept: NEUROLOGY | Age: 42
End: 2022-04-05

## 2022-04-05 VITALS
SYSTOLIC BLOOD PRESSURE: 90 MMHG | HEIGHT: 70 IN | WEIGHT: 275.2 LBS | RESPIRATION RATE: 12 BRPM | HEART RATE: 70 BPM | DIASTOLIC BLOOD PRESSURE: 62 MMHG | OXYGEN SATURATION: 96 % | BODY MASS INDEX: 39.4 KG/M2

## 2022-04-05 DIAGNOSIS — G43.719 INTRACTABLE CHRONIC MIGRAINE WITHOUT AURA AND WITHOUT STATUS MIGRAINOSUS: Primary | ICD-10-CM

## 2022-04-05 PROCEDURE — 64615 CHEMODENERV MUSC MIGRAINE: CPT | Performed by: NURSE PRACTITIONER

## 2022-04-05 ASSESSMENT — PAIN SCALES - GENERAL: PAINLEVEL: 3

## 2022-06-29 ENCOUNTER — APPOINTMENT (OUTPATIENT)
Dept: NEUROLOGY | Age: 42
End: 2022-06-29

## 2022-06-30 ENCOUNTER — OFFICE VISIT (OUTPATIENT)
Dept: NEUROLOGY | Age: 42
End: 2022-06-30

## 2022-06-30 VITALS
SYSTOLIC BLOOD PRESSURE: 108 MMHG | DIASTOLIC BLOOD PRESSURE: 60 MMHG | HEART RATE: 86 BPM | BODY MASS INDEX: 37.65 KG/M2 | RESPIRATION RATE: 16 BRPM | HEIGHT: 70 IN | OXYGEN SATURATION: 99 % | WEIGHT: 263 LBS

## 2022-06-30 DIAGNOSIS — G43.719 INTRACTABLE CHRONIC MIGRAINE WITHOUT AURA AND WITHOUT STATUS MIGRAINOSUS: Primary | ICD-10-CM

## 2022-06-30 PROCEDURE — 64615 CHEMODENERV MUSC MIGRAINE: CPT | Performed by: NURSE PRACTITIONER

## 2022-06-30 RX ORDER — LIRAGLUTIDE 6 MG/ML
3 INJECTION, SOLUTION SUBCUTANEOUS DAILY
COMMUNITY
Start: 2022-06-21 | End: 2023-06-16 | Stop reason: ALTCHOICE

## 2022-06-30 ASSESSMENT — PAIN SCALES - GENERAL: PAINLEVEL: 4

## 2022-09-16 ENCOUNTER — TELEPHONE (OUTPATIENT)
Dept: NEUROLOGY | Age: 42
End: 2022-09-16

## 2022-09-20 ENCOUNTER — APPOINTMENT (OUTPATIENT)
Dept: NEUROLOGY | Age: 42
End: 2022-09-20

## 2022-09-22 ENCOUNTER — APPOINTMENT (OUTPATIENT)
Dept: NEUROLOGY | Age: 42
End: 2022-09-22

## 2022-09-28 DIAGNOSIS — G43.711 CHRONIC MIGRAINE WITHOUT AURA, WITH INTRACTABLE MIGRAINE, SO STATED, WITH STATUS MIGRAINOSUS: Primary | ICD-10-CM

## 2022-10-03 ENCOUNTER — OFFICE VISIT (OUTPATIENT)
Dept: NEUROLOGY | Age: 42
End: 2022-10-03

## 2022-10-03 VITALS
DIASTOLIC BLOOD PRESSURE: 58 MMHG | RESPIRATION RATE: 16 BRPM | SYSTOLIC BLOOD PRESSURE: 86 MMHG | OXYGEN SATURATION: 98 % | BODY MASS INDEX: 35.58 KG/M2 | HEART RATE: 94 BPM | WEIGHT: 248 LBS

## 2022-10-03 DIAGNOSIS — G43.719 INTRACTABLE CHRONIC MIGRAINE WITHOUT AURA AND WITHOUT STATUS MIGRAINOSUS: Primary | ICD-10-CM

## 2022-10-03 PROCEDURE — 64615 CHEMODENERV MUSC MIGRAINE: CPT | Performed by: NURSE PRACTITIONER

## 2022-10-03 RX ORDER — PROPRANOLOL HYDROCHLORIDE 80 MG/1
80 TABLET ORAL DAILY
COMMUNITY
End: 2023-03-23 | Stop reason: DRUGHIGH

## 2022-10-03 RX ORDER — SILDENAFIL 50 MG/1
50 TABLET, FILM COATED ORAL DAILY PRN
COMMUNITY
Start: 2022-05-11

## 2022-10-03 ASSESSMENT — PAIN SCALES - GENERAL: PAINLEVEL: 3

## 2022-11-22 ENCOUNTER — TELEPHONE (OUTPATIENT)
Dept: PULMONOLOGY | Age: 42
End: 2022-11-22

## 2022-11-23 DIAGNOSIS — R06.00 DYSPNEA, UNSPECIFIED: Primary | ICD-10-CM

## 2022-12-27 ENCOUNTER — OFFICE VISIT (OUTPATIENT)
Dept: NEUROLOGY | Age: 42
End: 2022-12-27

## 2022-12-27 VITALS
DIASTOLIC BLOOD PRESSURE: 64 MMHG | OXYGEN SATURATION: 97 % | WEIGHT: 241 LBS | HEIGHT: 70 IN | RESPIRATION RATE: 12 BRPM | SYSTOLIC BLOOD PRESSURE: 100 MMHG | BODY MASS INDEX: 34.5 KG/M2 | HEART RATE: 81 BPM

## 2022-12-27 DIAGNOSIS — G43.719 INTRACTABLE CHRONIC MIGRAINE WITHOUT AURA AND WITHOUT STATUS MIGRAINOSUS: Primary | ICD-10-CM

## 2022-12-27 PROCEDURE — 64615 CHEMODENERV MUSC MIGRAINE: CPT | Performed by: NURSE PRACTITIONER

## 2022-12-27 RX ORDER — LISINOPRIL 5 MG/1
1 TABLET ORAL DAILY
COMMUNITY
Start: 2022-12-19

## 2022-12-27 RX ORDER — SUMATRIPTAN 6 MG/.5ML
INJECTION, SOLUTION SUBCUTANEOUS
Qty: 6 EACH | Refills: 11 | Status: SHIPPED | OUTPATIENT
Start: 2022-12-27

## 2023-01-24 ENCOUNTER — APPOINTMENT (OUTPATIENT)
Dept: PULMONOLOGY | Age: 43
End: 2023-01-24

## 2023-03-23 ENCOUNTER — OFFICE VISIT (OUTPATIENT)
Dept: NEUROLOGY | Age: 43
End: 2023-03-23

## 2023-03-23 ENCOUNTER — TELEPHONE (OUTPATIENT)
Dept: NEUROLOGY | Age: 43
End: 2023-03-23

## 2023-03-23 VITALS
RESPIRATION RATE: 16 BRPM | BODY MASS INDEX: 34.24 KG/M2 | WEIGHT: 238.6 LBS | DIASTOLIC BLOOD PRESSURE: 64 MMHG | SYSTOLIC BLOOD PRESSURE: 92 MMHG | OXYGEN SATURATION: 98 % | HEART RATE: 108 BPM

## 2023-03-23 DIAGNOSIS — G43.719 INTRACTABLE CHRONIC MIGRAINE WITHOUT AURA AND WITHOUT STATUS MIGRAINOSUS: Primary | ICD-10-CM

## 2023-03-23 PROCEDURE — 64615 CHEMODENERV MUSC MIGRAINE: CPT | Performed by: NURSE PRACTITIONER

## 2023-03-23 RX ORDER — PROPRANOLOL HCL 60 MG
1 CAPSULE, EXTENDED RELEASE 24HR ORAL DAILY
COMMUNITY
Start: 2022-10-11 | End: 2023-10-12

## 2023-03-23 ASSESSMENT — PAIN SCALES - GENERAL: PAINLEVEL: 3

## 2023-04-25 DIAGNOSIS — G43.909 MIGRAINE: Primary | ICD-10-CM

## 2023-06-13 ENCOUNTER — CLINICAL ABSTRACT (OUTPATIENT)
Dept: NEUROLOGY | Age: 43
End: 2023-06-13

## 2023-06-16 ENCOUNTER — OFFICE VISIT (OUTPATIENT)
Dept: NEUROLOGY | Age: 43
End: 2023-06-16

## 2023-06-16 VITALS
BODY MASS INDEX: 33.48 KG/M2 | DIASTOLIC BLOOD PRESSURE: 58 MMHG | OXYGEN SATURATION: 98 % | HEART RATE: 87 BPM | WEIGHT: 233.3 LBS | SYSTOLIC BLOOD PRESSURE: 90 MMHG | RESPIRATION RATE: 16 BRPM

## 2023-06-16 DIAGNOSIS — G43.719 INTRACTABLE CHRONIC MIGRAINE WITHOUT AURA AND WITHOUT STATUS MIGRAINOSUS: Primary | ICD-10-CM

## 2023-06-16 PROCEDURE — 64615 CHEMODENERV MUSC MIGRAINE: CPT | Performed by: NURSE PRACTITIONER

## 2023-06-16 RX ORDER — ALBUTEROL SULFATE 90 UG/1
AEROSOL, METERED RESPIRATORY (INHALATION)
COMMUNITY
Start: 2023-01-16

## 2023-06-16 RX ORDER — TAMSULOSIN HYDROCHLORIDE 0.4 MG/1
0.4 CAPSULE ORAL AT BEDTIME
COMMUNITY
Start: 2023-06-02

## 2023-06-16 RX ORDER — LIDOCAINE 50 MG/G
PATCH TOPICAL
COMMUNITY
Start: 2023-01-03

## 2023-06-16 ASSESSMENT — PAIN SCALES - GENERAL: PAINLEVEL: 4

## 2023-09-11 ENCOUNTER — APPOINTMENT (OUTPATIENT)
Dept: NEUROLOGY | Age: 43
End: 2023-09-11

## 2023-09-18 ENCOUNTER — OFFICE VISIT (OUTPATIENT)
Dept: NEUROLOGY | Age: 43
End: 2023-09-18

## 2023-09-18 VITALS
WEIGHT: 227.7 LBS | BODY MASS INDEX: 32.67 KG/M2 | HEART RATE: 86 BPM | SYSTOLIC BLOOD PRESSURE: 138 MMHG | DIASTOLIC BLOOD PRESSURE: 84 MMHG | OXYGEN SATURATION: 98 % | RESPIRATION RATE: 16 BRPM

## 2023-09-18 DIAGNOSIS — G43.719 INTRACTABLE CHRONIC MIGRAINE WITHOUT AURA AND WITHOUT STATUS MIGRAINOSUS: Primary | ICD-10-CM

## 2023-09-18 PROCEDURE — 64615 CHEMODENERV MUSC MIGRAINE: CPT | Performed by: NURSE PRACTITIONER

## 2023-09-18 RX ORDER — AZELASTINE HYDROCHLORIDE 137 UG/1
SPRAY, METERED NASAL
COMMUNITY
Start: 2023-07-14

## 2023-09-18 ASSESSMENT — PAIN SCALES - GENERAL: PAINLEVEL: 3

## 2023-12-11 ENCOUNTER — APPOINTMENT (OUTPATIENT)
Dept: NEUROLOGY | Age: 43
End: 2023-12-11

## 2023-12-12 ENCOUNTER — APPOINTMENT (OUTPATIENT)
Dept: REHABILITATION | Age: 43
End: 2023-12-12

## 2023-12-12 VITALS
SYSTOLIC BLOOD PRESSURE: 115 MMHG | OXYGEN SATURATION: 98 % | BODY MASS INDEX: 32.5 KG/M2 | HEIGHT: 70 IN | DIASTOLIC BLOOD PRESSURE: 81 MMHG | WEIGHT: 227 LBS | HEART RATE: 86 BPM

## 2023-12-12 DIAGNOSIS — G43.719 INTRACTABLE CHRONIC MIGRAINE WITHOUT AURA AND WITHOUT STATUS MIGRAINOSUS: Primary | ICD-10-CM

## 2023-12-12 PROCEDURE — 64615 CHEMODENERV MUSC MIGRAINE: CPT | Performed by: NURSE PRACTITIONER

## 2023-12-12 RX ORDER — TRIAMCINOLONE ACETONIDE 40 MG/ML
20 INJECTION, SUSPENSION INTRA-ARTICULAR; INTRAMUSCULAR
COMMUNITY

## 2023-12-12 RX ORDER — BUPROPION HYDROCHLORIDE 150 MG/1
TABLET, FILM COATED, EXTENDED RELEASE ORAL
COMMUNITY

## 2023-12-12 RX ORDER — OXYCODONE HYDROCHLORIDE 5 MG/1
5 TABLET ORAL EVERY 4 HOURS
COMMUNITY
Start: 2023-12-08

## 2023-12-12 RX ORDER — LORATADINE 10 MG/1
10 TABLET ORAL
COMMUNITY
Start: 2023-08-28

## 2024-01-09 NOTE — PLAN OF CARE
Problem: Patient Care Overview  Goal: Plan of Care Review  Outcome: Ongoing (interventions implemented as appropriate)   07/02/19 0341   Coping/Psychosocial   Plan of Care Reviewed With patient   Plan of Care Review   Progress no change   OTHER   Outcome Summary VSS, complains of some pain relieved by meds, ambulating well, slept well through night continue to monitor.      Goal: Individualization and Mutuality  Outcome: Ongoing (interventions implemented as appropriate)    Goal: Discharge Needs Assessment  Outcome: Ongoing (interventions implemented as appropriate)    Goal: Interprofessional Rounds/Family Conf  Outcome: Ongoing (interventions implemented as appropriate)      Problem: Surgery Nonspecified (Adult)  Goal: Signs and Symptoms of Listed Potential Problems Will be Absent, Minimized or Managed (Surgery Nonspecified)  Outcome: Ongoing (interventions implemented as appropriate)    Goal: Anesthesia/Sedation Recovery  Outcome: Outcome(s) achieved Date Met: 07/02/19      Problem: Pain, Acute (Adult)  Goal: Identify Related Risk Factors and Signs and Symptoms  Outcome: Ongoing (interventions implemented as appropriate)    Goal: Acceptable Pain Control/Comfort Level  Outcome: Ongoing (interventions implemented as appropriate)        
Initial (On Arrival)

## 2024-01-26 DIAGNOSIS — G43.719 INTRACTABLE CHRONIC MIGRAINE WITHOUT AURA: Primary | ICD-10-CM

## 2024-03-06 ENCOUNTER — APPOINTMENT (OUTPATIENT)
Dept: REHABILITATION | Age: 44
End: 2024-03-06

## 2024-03-07 ENCOUNTER — APPOINTMENT (OUTPATIENT)
Dept: REHABILITATION | Age: 44
End: 2024-03-07

## 2024-03-08 ENCOUNTER — APPOINTMENT (OUTPATIENT)
Dept: REHABILITATION | Age: 44
End: 2024-03-08
Attending: INTERNAL MEDICINE

## 2024-03-08 VITALS
OXYGEN SATURATION: 100 % | HEIGHT: 70 IN | DIASTOLIC BLOOD PRESSURE: 71 MMHG | HEART RATE: 91 BPM | SYSTOLIC BLOOD PRESSURE: 98 MMHG | WEIGHT: 227 LBS | BODY MASS INDEX: 32.5 KG/M2

## 2024-03-08 DIAGNOSIS — G43.719 INTRACTABLE CHRONIC MIGRAINE WITHOUT AURA AND WITHOUT STATUS MIGRAINOSUS: Primary | ICD-10-CM

## 2024-03-08 RX ORDER — BENZONATATE 200 MG/1
CAPSULE ORAL
COMMUNITY
Start: 2024-02-18

## 2024-05-31 ENCOUNTER — APPOINTMENT (OUTPATIENT)
Dept: REHABILITATION | Age: 44
End: 2024-05-31
Attending: INTERNAL MEDICINE

## 2024-05-31 VITALS
OXYGEN SATURATION: 100 % | WEIGHT: 212.1 LBS | BODY MASS INDEX: 30.37 KG/M2 | HEIGHT: 70 IN | SYSTOLIC BLOOD PRESSURE: 117 MMHG | DIASTOLIC BLOOD PRESSURE: 83 MMHG | HEART RATE: 68 BPM

## 2024-05-31 DIAGNOSIS — I10 HYPERTENSION, UNSPECIFIED TYPE: ICD-10-CM

## 2024-05-31 DIAGNOSIS — G43.719 INTRACTABLE CHRONIC MIGRAINE WITHOUT AURA AND WITHOUT STATUS MIGRAINOSUS: Primary | ICD-10-CM

## 2024-05-31 RX ORDER — FLUCONAZOLE 150 MG/1
150 TABLET ORAL
COMMUNITY
Start: 2024-05-17

## 2024-05-31 RX ORDER — DOXYCYCLINE HYCLATE 100 MG/1
CAPSULE ORAL
COMMUNITY
Start: 2024-02-18

## 2024-06-11 ENCOUNTER — TELEPHONE (OUTPATIENT)
Dept: REHABILITATION | Age: 44
End: 2024-06-11

## 2024-06-20 ENCOUNTER — TELEPHONE (OUTPATIENT)
Dept: REHABILITATION | Age: 44
End: 2024-06-20

## 2024-06-25 ENCOUNTER — TELEPHONE (OUTPATIENT)
Dept: REHABILITATION | Age: 44
End: 2024-06-25

## 2024-08-23 ENCOUNTER — APPOINTMENT (OUTPATIENT)
Dept: REHABILITATION | Age: 44
End: 2024-08-23
Attending: INTERNAL MEDICINE

## 2024-08-23 VITALS
OXYGEN SATURATION: 99 % | HEART RATE: 101 BPM | DIASTOLIC BLOOD PRESSURE: 76 MMHG | SYSTOLIC BLOOD PRESSURE: 110 MMHG | WEIGHT: 201 LBS | HEIGHT: 70 IN | BODY MASS INDEX: 28.77 KG/M2

## 2024-08-23 DIAGNOSIS — G43.719 INTRACTABLE CHRONIC MIGRAINE WITHOUT AURA AND WITHOUT STATUS MIGRAINOSUS: Primary | ICD-10-CM

## 2024-08-30 ENCOUNTER — OFF PREMISE (OUTPATIENT)
Dept: REHABILITATION | Age: 44
End: 2024-08-30

## 2024-08-30 DIAGNOSIS — G43.719 INTRACTABLE CHRONIC MIGRAINE WITHOUT AURA AND WITHOUT STATUS MIGRAINOSUS: Primary | ICD-10-CM

## 2024-11-18 ENCOUNTER — APPOINTMENT (OUTPATIENT)
Dept: REHABILITATION | Age: 44
End: 2024-11-18
Attending: INTERNAL MEDICINE

## 2024-11-18 VITALS
SYSTOLIC BLOOD PRESSURE: 118 MMHG | BODY MASS INDEX: 29.55 KG/M2 | HEART RATE: 86 BPM | WEIGHT: 206.4 LBS | HEIGHT: 70 IN | DIASTOLIC BLOOD PRESSURE: 73 MMHG | OXYGEN SATURATION: 99 %

## 2024-11-18 DIAGNOSIS — G43.719 INTRACTABLE CHRONIC MIGRAINE WITHOUT AURA AND WITHOUT STATUS MIGRAINOSUS: Primary | ICD-10-CM

## 2024-11-18 DIAGNOSIS — I10 HYPERTENSION, UNSPECIFIED TYPE: ICD-10-CM

## 2024-11-18 PROCEDURE — 64615 CHEMODENERV MUSC MIGRAINE: CPT | Performed by: NURSE PRACTITIONER

## 2024-11-18 ASSESSMENT — PAIN SCALES - GENERAL: PAINLEVEL: 4

## 2024-11-22 ENCOUNTER — TELEPHONE (OUTPATIENT)
Dept: OTHER | Age: 44
End: 2024-11-22

## 2025-02-10 ENCOUNTER — APPOINTMENT (OUTPATIENT)
Dept: REHABILITATION | Age: 45
End: 2025-02-10
Attending: INTERNAL MEDICINE

## 2025-02-10 VITALS
OXYGEN SATURATION: 96 % | SYSTOLIC BLOOD PRESSURE: 105 MMHG | BODY MASS INDEX: 29.49 KG/M2 | DIASTOLIC BLOOD PRESSURE: 77 MMHG | HEIGHT: 70 IN | HEART RATE: 98 BPM | WEIGHT: 206 LBS

## 2025-02-10 DIAGNOSIS — G43.719 INTRACTABLE CHRONIC MIGRAINE WITHOUT AURA AND WITHOUT STATUS MIGRAINOSUS: Primary | ICD-10-CM

## 2025-02-10 PROCEDURE — 64615 CHEMODENERV MUSC MIGRAINE: CPT | Performed by: NURSE PRACTITIONER

## 2025-03-13 ENCOUNTER — TELEPHONE (OUTPATIENT)
Dept: REHABILITATION | Age: 45
End: 2025-03-13

## 2025-03-13 DIAGNOSIS — G43.719 INTRACTABLE CHRONIC MIGRAINE WITHOUT AURA AND WITHOUT STATUS MIGRAINOSUS: ICD-10-CM

## 2025-03-13 DIAGNOSIS — I10 HYPERTENSION, UNSPECIFIED TYPE: ICD-10-CM

## 2025-05-06 ENCOUNTER — APPOINTMENT (OUTPATIENT)
Dept: REHABILITATION | Age: 45
End: 2025-05-06
Attending: INTERNAL MEDICINE

## 2025-05-06 ENCOUNTER — TELEPHONE (OUTPATIENT)
Dept: REHABILITATION | Age: 45
End: 2025-05-06

## 2025-05-06 DIAGNOSIS — I10 HYPERTENSION, UNSPECIFIED TYPE: ICD-10-CM

## 2025-05-06 DIAGNOSIS — G43.719 INTRACTABLE CHRONIC MIGRAINE WITHOUT AURA AND WITHOUT STATUS MIGRAINOSUS: Primary | ICD-10-CM

## 2025-05-06 PROCEDURE — 64615 CHEMODENERV MUSC MIGRAINE: CPT | Performed by: NURSE PRACTITIONER

## 2025-05-06 RX ORDER — OMEPRAZOLE 20 MG/1
CAPSULE, DELAYED RELEASE ORAL EVERY 24 HOURS
COMMUNITY

## 2025-05-06 RX ORDER — ECHINACEA PURPUREA EXTRACT 125 MG
2 TABLET ORAL
COMMUNITY
Start: 2024-10-25 | End: 2025-10-20

## 2025-05-06 RX ORDER — DEXTROAMPHETAMINE SACCHARATE, AMPHETAMINE ASPARTATE MONOHYDRATE, DEXTROAMPHETAMINE SULFATE AND AMPHETAMINE SULFATE 7.5; 7.5; 7.5; 7.5 MG/1; MG/1; MG/1; MG/1
30 CAPSULE, EXTENDED RELEASE ORAL
COMMUNITY
Start: 2025-04-15

## 2025-05-06 RX ORDER — AZELASTINE HYDROCHLORIDE 137 UG/1
SPRAY, METERED NASAL EVERY 12 HOURS
COMMUNITY

## 2025-05-07 ENCOUNTER — E-ADVICE (OUTPATIENT)
Dept: REHABILITATION | Age: 45
End: 2025-05-07

## 2025-05-08 DIAGNOSIS — I10 HYPERTENSION, UNSPECIFIED TYPE: ICD-10-CM

## 2025-05-08 DIAGNOSIS — G43.719 INTRACTABLE CHRONIC MIGRAINE WITHOUT AURA AND WITHOUT STATUS MIGRAINOSUS: ICD-10-CM

## 2025-05-09 DIAGNOSIS — G43.719 CHRONIC MIGRAINE WITHOUT AURA, INTRACTABLE, WITHOUT STATUS MIGRAINOSUS: Primary | ICD-10-CM

## 2025-05-15 PROBLEM — F33.0 MAJOR DEPRESSIVE DISORDER, RECURRENT, MILD (CMD): Status: ACTIVE | Noted: 2025-05-15

## 2025-05-15 PROBLEM — N40.0 BENIGN PROSTATIC HYPERPLASIA WITHOUT URINARY OBSTRUCTION: Status: ACTIVE | Noted: 2025-05-15

## 2025-05-15 PROBLEM — F90.9 ADHD (ATTENTION DEFICIT HYPERACTIVITY DISORDER): Status: ACTIVE | Noted: 2025-05-15

## 2025-05-15 PROBLEM — I10 ESSENTIAL HYPERTENSION: Status: ACTIVE | Noted: 2025-05-15

## 2025-05-15 PROBLEM — Z77.29 CONTACT WITH AND (SUSPECTED) EXPOSURE TO OTHER HAZARDOUS SUBSTANCES: Status: ACTIVE | Noted: 2025-05-15

## 2025-05-15 PROBLEM — E78.5 HYPERLIPIDEMIA: Status: ACTIVE | Noted: 2025-05-15

## 2025-05-15 PROBLEM — J45.909 ASTHMA (CMD): Status: ACTIVE | Noted: 2025-05-15

## 2025-05-15 PROBLEM — G47.33 OSA (OBSTRUCTIVE SLEEP APNEA): Status: ACTIVE | Noted: 2025-05-15

## 2025-05-15 PROBLEM — F41.1 GAD (GENERALIZED ANXIETY DISORDER): Status: ACTIVE | Noted: 2025-05-15

## 2025-05-15 PROBLEM — J32.9 CHRONIC SINUSITIS: Status: ACTIVE | Noted: 2025-05-15

## 2025-05-15 PROBLEM — M51.369 DEGENERATION OF LUMBAR INTERVERTEBRAL DISC: Status: ACTIVE | Noted: 2025-05-15

## 2025-05-15 PROBLEM — G43.109 MIGRAINE WITH AURA, NOT INTRACTABLE, WITHOUT STATUS MIGRAINOSUS: Status: ACTIVE | Noted: 2025-05-15

## 2025-05-15 PROBLEM — K58.2 MIXED IRRITABLE BOWEL SYNDROME: Status: ACTIVE | Noted: 2025-05-15

## 2025-05-15 PROBLEM — J30.9 ALLERGIC RHINITIS: Status: ACTIVE | Noted: 2025-05-15

## 2025-05-15 PROBLEM — K21.9 GASTROESOPHAGEAL REFLUX DISEASE WITHOUT ESOPHAGITIS: Status: ACTIVE | Noted: 2025-05-15

## 2025-05-15 PROBLEM — F43.12 CHRONIC POST-TRAUMATIC STRESS DISORDER (PTSD): Status: ACTIVE | Noted: 2025-05-15

## 2025-05-15 PROBLEM — M47.812 CERVICAL ARTHRITIS: Status: ACTIVE | Noted: 2025-05-15

## 2025-05-29 PROBLEM — S06.9X0A: Status: ACTIVE | Noted: 2025-05-29

## 2025-05-29 PROBLEM — F43.29 ADJUSTMENT DISORDER WITH MIXED EMOTIONAL FEATURES: Status: ACTIVE | Noted: 2025-05-29

## 2025-05-29 PROBLEM — R45.4 IRRITABILITY AND ANGER: Status: ACTIVE | Noted: 2025-05-29

## 2025-05-29 PROBLEM — R06.00 DYSPNEA: Status: ACTIVE | Noted: 2025-05-29

## 2025-07-29 ENCOUNTER — APPOINTMENT (OUTPATIENT)
Dept: REHABILITATION | Age: 45
End: 2025-07-29
Attending: INTERNAL MEDICINE

## 2025-07-29 DIAGNOSIS — I10 HYPERTENSION, UNSPECIFIED TYPE: ICD-10-CM

## 2025-07-29 DIAGNOSIS — G43.719 INTRACTABLE CHRONIC MIGRAINE WITHOUT AURA AND WITHOUT STATUS MIGRAINOSUS: Primary | ICD-10-CM

## 2025-07-29 PROCEDURE — 64615 CHEMODENERV MUSC MIGRAINE: CPT | Performed by: NURSE PRACTITIONER

## 2025-10-30 ENCOUNTER — APPOINTMENT (OUTPATIENT)
Dept: REHABILITATION | Age: 45
End: 2025-10-30
Attending: INTERNAL MEDICINE

## (undated) DEVICE — COAGULATOR SXN HNDSWITCH 10F16IN

## (undated) DEVICE — GLV SURG TRIUMPH LT PF LTX 7.5 STRL

## (undated) DEVICE — SUT PLAIN 1 4/0 1828H

## (undated) DEVICE — GLV SURG SENSICARE PI PF LF 7 GRN STRL

## (undated) DEVICE — CONTAINER,SPECIMEN,OR STERILE,4OZ: Brand: MEDLINE

## (undated) DEVICE — SOL IRR NACL 0.9PCT BT 1000ML

## (undated) DEVICE — PAD GRND REM POLYHESIVE A/ DISP

## (undated) DEVICE — GOWN,AURORA,NOREINF,RAGLAN,XL,STERILE: Brand: MEDLINE

## (undated) DEVICE — PK ENT LF 60

## (undated) DEVICE — GLV SURG SENSICARE POLYISPRN W/ALOE PF LF 6.5 GRN STRL

## (undated) DEVICE — TBG DECLOG DIEGO ELITE MULTIDEBRIDER ST

## (undated) DEVICE — TP SXN YANKR BLB TIP W/TBG 10F LF STRL

## (undated) DEVICE — 9165 UNIVERSAL PATIENT PLATE: Brand: 3M™

## (undated) DEVICE — SUT PDS 4-0 RB-1 Z304H

## (undated) DEVICE — CODMAN® SURGICAL PATTIES 1/2" X 3" (1.27CM X 7.62CM): Brand: CODMAN®

## (undated) DEVICE — GLV SURG SENSICARE PI LF PF 8 GRN STRL

## (undated) DEVICE — INTENDED FOR TISSUE SEPARATION, AND OTHER PROCEDURES THAT REQUIRE A SHARP SURGICAL BLADE TO PUNCTURE OR CUT.: Brand: BARD-PARKER ® CARBON RIB-BACK BLADES

## (undated) DEVICE — STERILE POLYISOPRENE POWDER-FREE SURGICAL GLOVES WITH EMOLLIENT COATING: Brand: PROTEXIS

## (undated) DEVICE — TRY IRR

## (undated) DEVICE — NDL SPINE 25G 4 11/16 BLU

## (undated) DEVICE — BLD BIPOL DIEGO SMR STR STD TYPE A 2MM